# Patient Record
Sex: FEMALE | URBAN - METROPOLITAN AREA
[De-identification: names, ages, dates, MRNs, and addresses within clinical notes are randomized per-mention and may not be internally consistent; named-entity substitution may affect disease eponyms.]

---

## 2017-07-05 ENCOUNTER — HOSPITAL ENCOUNTER (EMERGENCY)
Facility: CLINIC | Age: 22
Discharge: HOME | End: 2017-07-05
Attending: EMERGENCY MEDICINE | Admitting: EMERGENCY MEDICINE

## 2017-07-05 VITALS — HEART RATE: 87 BPM | TEMPERATURE: 98.6 F | OXYGEN SATURATION: 100 % | RESPIRATION RATE: 16 BRPM

## 2017-07-05 DIAGNOSIS — L20.82 FLEXURAL ECZEMA: ICD-10-CM

## 2017-07-05 PROCEDURE — 99282 EMERGENCY DEPT VISIT SF MDM: CPT

## 2017-07-05 RX ORDER — TRIAMCINOLONE ACETONIDE 1 MG/G
CREAM TOPICAL
Qty: 80 G | Refills: 1 | Status: SHIPPED | OUTPATIENT
Start: 2017-07-05 | End: 2017-07-19

## 2017-07-05 RX ORDER — PREDNISONE 50 MG/1
50 TABLET ORAL DAILY
Qty: 5 TABLET | Refills: 0 | Status: SHIPPED | OUTPATIENT
Start: 2017-07-05 | End: 2017-07-10

## 2017-07-05 NOTE — ED AVS SNAPSHOT
[facility]    [address]    [facility] [address]    Phone:  [phone]    Fax:  [phone]                                       Veronica Cody   MRN: [id number]    Department:  [facility]   Date of Visit:  7/5/2017           Patient Information     Date Of Birth          1995        Your diagnoses for this visit were:     Flexural eczema        You were seen by Evelina Mccurdy MD.      Follow-up Information     Follow up with [facility].    Specialty:  EMERGENCY MEDICINE    Why:  As needed, If symptoms worsen    Contact information:    [address]  [facility] [id number] [phone]      Discharge References/Attachments     ATOPIC DERMATITIS (ECZEMA) (ENGLISH)      24 Hour Appointment Hotline       To make an appointment at any [facility], call [phone] ([phone]). If you don't have a family doctor or clinic, we will help you find one. [facility] clinics are conveniently located to serve the needs of you and your family.             Review of your medicines      START taking        Dose / Directions Last dose taken    predniSONE 50 MG tablet   Commonly known as:  DELTASONE   Dose:  50 mg   Quantity:  5 tablet        Take 1 tablet (50 mg) by mouth daily for 5 days   Refills:  0        triamcinolone 0.1 % cream   Commonly known as:  KENALOG   Quantity:  80 g        Apply to areas with rash.   Refills:  1                Prescriptions were sent or printed at these locations (2 Prescriptions)                   Other Prescriptions                Printed at Department/Unit printer (2 of 2)         predniSONE (DELTASONE) 50 MG tablet               triamcinolone (KENALOG) 0.1 % cream                Orders Needing Specimen Collection     None      Pending Results     No orders found from 7/3/2017 to 7/6/2017.            Pending Culture Results     No  orders found from 7/3/2017 to 7/6/2017.            Pending Results Instructions     If you had any lab results that were not finalized at the time of your Discharge, you can call the ED Lab Result RN at [phone]. You will be contacted by this team for any positive Lab results or changes in treatment. The nurses are available 7 days a week from 10A to 6:30P.  You can leave a message 24 hours per day and they will return your call.        Test Results From Your Hospital Stay               Clinical Quality Measure: Blood Pressure Screening     Your blood pressure was checked while you were in the emergency department today. The last reading we obtained was    . Please read the guidelines below about what these numbers mean and what you should do about them.  If your systolic blood pressure (the top number) is less than 120 and your diastolic blood pressure (the bottom number) is less than 80, then your blood pressure is normal. There is nothing more that you need to do about it.  If your systolic blood pressure (the top number) is 120-139 or your diastolic blood pressure (the bottom number) is 80-89, your blood pressure may be higher than it should be. You should have your blood pressure rechecked within a year by a primary care provider.  If your systolic blood pressure (the top number) is 140 or greater or your diastolic blood pressure (the bottom number) is 90 or greater, you may have high blood pressure. High blood pressure is treatable, but if left untreated over time it can put you at risk for heart attack, stroke, or kidney failure. You should have your blood pressure rechecked by a primary care provider within the next 4 weeks.  If your provider in the emergency department today gave you specific instructions to follow-up with your doctor or provider even sooner than that, you should follow that instruction and not wait for up to 4 weeks for your follow-up visit.        Thank you for choosing [facility]      "  Thank you for choosing [facility] for your care. Our goal is always to provide you with excellent care. Hearing back from our patients is one way we can continue to improve our services. Please take a few minutes to complete the written survey that you may receive in the mail after you visit with us. Thank you!        [employer] Information     [employer] lets you send messages to your doctor, view your test results, renew your prescriptions, schedule appointments and more. To sign up, go to [email or web address] . Click on \"Log in\" on the left side of the screen, which will take you to the Welcome page. Then click on \"Sign up Now\" on the right side of the page.     You will be asked to enter the access code listed below, as well as some personal information. Please follow the directions to create your username and password.     Your access code is: [email or web address]  Expires: 10/3/2017 12:03 PM     Your access code will  in 90 days. If you need help or a new code, please call your [facility] clinic or [phone].        Care EveryWhere ID     This is your Care EveryWhere ID. This could be used by other organizations to access your [facility] medical records  [id number]        Equal Access to Services     MINERVA ELIZONDO : Steve coreyo Regis, waaxda luramezadaha, qaybta kaalmada adesintiayada, vic finney. So [facility] [phone].    ATENCIÓN: Si habla español, tiene a mcelroy disposición servicios gratuitos de asistencia lingüística. Llame al [phone].    We comply with applicable federal civil rights laws and [place] laws. We do not discriminate on the basis of race, color, national origin, age, disability sex, sexual orientation or gender identity.            After Visit Summary       This is your record. Keep this with you and show to your community pharmacist(s) and doctor(s) at your next visit.                  "

## 2017-07-05 NOTE — ED PROVIDER NOTES
History     Chief Complaint:  Skin Irritation     HPI   Veronica Cody is a 22 year old female who presents to the emergency department today for evaluation of skin irritation. Patient reports red and itchy bumps on the back of her legs and elbows. Patient reports previous problems with eczema a few years back. The patient has no other concerns at this time.      Allergies:  No Known Drug Allergies      Medications:    The patient is currently on no regular medications.     Past Medical History:    No past medical history on file.    Past Surgical History:    No past surgical history on file.    Family History:    No family history on file.    Social History:  Not on file.      Review of Systems   Skin: Positive for rash (diffuse rash to her legs and arms).   All other systems reviewed and are negative.    Physical Exam     Patient Vitals for the past 24 hrs:   Temp Temp src Pulse Resp SpO2   07/05/17 1155 98.6  F (37  C) Oral 87 18 100 %        Physical Exam  General/Appearance: appears stated age, well-groomed, appears comfortable  Eyes: EOMI, no scleral injection, no icterus  ENT: [facility]  Neck: supple, nl ROM, no stiffness  Cardiovascular: RRR, nl S1S2, no m/r/g, 2+ pulses in all 4 extremities, cap refill <2sec  Respiratory: CTAB, good air movement throughout, no wheezes/rhonchi/rales, no increased WOB, no retractions  MSK: FOLEY, good tone, no bony abnormality  Skin: warm and well-perfused, increased skin thickness and darkening in bilateral AC fossa and axilla with some urticaria to bilateral LE, no edema, no ecchymosis, nl turgor  Neuro: GCS 15, alert and oriented, no gross focal neuro deficits  Psych: interacts appropriately  Heme: no petechia, no purpura, no active bleeding      Emergency Department Course     Emergency Department Course:  Nursing notes and vitals reviewed.  I performed an exam of the patient as documented above.   I discussed the treatment plan with the patient. They expressed understanding of  this plan and consented to discharge. They will be discharged home with instructions for care and follow up. In addition, the patient will return to the emergency department if their symptoms persist, worsen, if new symptoms arise or if there is any concern.  All questions were answered.     Impression & Plan      Medical Decision Making:  This patient is a 22 year old female with history of eczema who presents with the same. She has pruritic axanthum with some thickened skin to her flexor regions. She additionally has some scattered urticaria of non-specific etiology. There is no concerning factors such as Nikolsky's sign, petechiae, or purpura, or oral mucosa or other involvement. She will be discharged home with prednisone as well as topical triamcinolone to be used as needed.      Diagnosis:  No diagnosis found.     Disposition:   Discharged to home with the below prescription      Discharge Medications:  New Prescriptions    PREDNISONE (DELTASONE) 50 MG TABLET    Take 1 tablet (50 mg) by mouth daily for 5 days    TRIAMCINOLONE (KENALOG) 0.1 % CREAM    Apply to areas with rash.     Scribe Disclosure:  I, Curt Dela Cruz, am serving as a scribe at 11:55 AM on 7/5/2017 to document services personally performed by Evelina Mccurdy MD, based on my observations and the provider's statements to me.     Curt Dela Cruz  7/5/2017   [facility] EMERGENCY DEPARTMENT       Evelina Mccurdy MD  07/05/17 8723

## 2017-07-05 NOTE — ED AVS SNAPSHOT
[facility]    [address]    [facility] [address]    Phone:  [phone]    Fax:  [phone]                                       Veronica Cody   MRN: [id number]    Department:  [facility]   Date of Visit:  7/5/2017           After Visit Summary Signature Page     I have received my discharge instructions, and my questions have been answered. I have discussed any challenges I see with this plan with the nurse or doctor.    ..........................................................................................................................................  Patient/Patient Representative Signature      ..........................................................................................................................................  Patient Representative Print Name and Relationship to Patient    ..................................................               ................................................  Date                                            Time    ..........................................................................................................................................  Reviewed by Signature/Title    ...................................................              ..............................................  Date                                                            Time

## 2017-12-26 ENCOUNTER — HOSPITAL ENCOUNTER (INPATIENT)
Facility: CLINIC | Age: 22
LOS: 4 days | Discharge: HOME | DRG: 439 | End: 2017-12-30
Attending: EMERGENCY MEDICINE | Admitting: INTERNAL MEDICINE

## 2017-12-26 ENCOUNTER — TRANSFERRED RECORDS (OUTPATIENT)
Dept: HEALTH INFORMATION MANAGEMENT | Facility: CLINIC | Age: 22
End: 2017-12-26

## 2017-12-26 ENCOUNTER — APPOINTMENT (OUTPATIENT)
Dept: ULTRASOUND IMAGING | Facility: CLINIC | Age: 22
DRG: 439 | End: 2017-12-26
Attending: EMERGENCY MEDICINE

## 2017-12-26 DIAGNOSIS — N39.0 ACUTE UTI: ICD-10-CM

## 2017-12-26 DIAGNOSIS — L20.82 FLEXURAL ECZEMA: ICD-10-CM

## 2017-12-26 DIAGNOSIS — L30.8 OTHER ECZEMA: Primary | ICD-10-CM

## 2017-12-26 DIAGNOSIS — K85.90 ACUTE PANCREATITIS, UNSPECIFIED COMPLICATION STATUS, UNSPECIFIED PANCREATITIS TYPE: ICD-10-CM

## 2017-12-26 DIAGNOSIS — R11.2 NON-INTRACTABLE VOMITING WITH NAUSEA, UNSPECIFIED VOMITING TYPE: ICD-10-CM

## 2017-12-26 DIAGNOSIS — K92.0 HEMATEMESIS WITH NAUSEA: ICD-10-CM

## 2017-12-26 LAB
ALBUMIN UR-MCNC: 30 MG/DL
ALT SERPL-CCNC: 114 U/L (ref 0–78)
APPEARANCE UR: ABNORMAL
AST SERPL-CCNC: 250 U/L (ref 0–37)
BACTERIA #/AREA URNS HPF: ABNORMAL /HPF
BILIRUB UR QL STRIP: NEGATIVE
COLOR UR AUTO: ABNORMAL
CREAT SERPL-MCNC: 0.71 MG/DL (ref 0.6–1.3)
GLUCOSE SERPL-MCNC: 117 MG/DL (ref 70–99)
GLUCOSE UR STRIP-MCNC: NEGATIVE MG/DL
HCG UR QL: NEGATIVE
HGB UR QL STRIP: ABNORMAL
KETONES UR STRIP-MCNC: 20 MG/DL
LEUKOCYTE ESTERASE UR QL STRIP: ABNORMAL
LIPASE SERPL-CCNC: 2623 U/L (ref 73–393)
MUCOUS THREADS #/AREA URNS LPF: PRESENT /LPF
NITRATE UR QL: NEGATIVE
PH UR STRIP: 5 PH (ref 5–7)
POTASSIUM SERPL-SCNC: 4.2 MMOL/L (ref 3.5–5.1)
RBC #/AREA URNS AUTO: 4 /HPF (ref 0–2)
SOURCE: ABNORMAL
SP GR UR STRIP: 1.03 (ref 1–1.03)
SQUAMOUS #/AREA URNS AUTO: 7 /HPF (ref 0–1)
UROBILINOGEN UR STRIP-MCNC: 2 MG/DL (ref 0–2)
WBC #/AREA URNS AUTO: 31 /HPF (ref 0–2)

## 2017-12-26 PROCEDURE — 25000128 H RX IP 250 OP 636: Performed by: INTERNAL MEDICINE

## 2017-12-26 PROCEDURE — 25000125 ZZHC RX 250: Performed by: EMERGENCY MEDICINE

## 2017-12-26 PROCEDURE — 76705 ECHO EXAM OF ABDOMEN: CPT

## 2017-12-26 PROCEDURE — 81001 URINALYSIS AUTO W/SCOPE: CPT | Performed by: EMERGENCY MEDICINE

## 2017-12-26 PROCEDURE — 25000128 H RX IP 250 OP 636: Performed by: EMERGENCY MEDICINE

## 2017-12-26 PROCEDURE — 96375 TX/PRO/DX INJ NEW DRUG ADDON: CPT

## 2017-12-26 PROCEDURE — 96365 THER/PROPH/DIAG IV INF INIT: CPT

## 2017-12-26 PROCEDURE — 96361 HYDRATE IV INFUSION ADD-ON: CPT

## 2017-12-26 PROCEDURE — 99285 EMERGENCY DEPT VISIT HI MDM: CPT | Mod: 25

## 2017-12-26 PROCEDURE — 96376 TX/PRO/DX INJ SAME DRUG ADON: CPT

## 2017-12-26 PROCEDURE — 83690 ASSAY OF LIPASE: CPT | Performed by: EMERGENCY MEDICINE

## 2017-12-26 PROCEDURE — 87086 URINE CULTURE/COLONY COUNT: CPT | Performed by: EMERGENCY MEDICINE

## 2017-12-26 PROCEDURE — 99223 1ST HOSP IP/OBS HIGH 75: CPT | Performed by: INTERNAL MEDICINE

## 2017-12-26 PROCEDURE — 25000132 ZZH RX MED GY IP 250 OP 250 PS 637: Performed by: INTERNAL MEDICINE

## 2017-12-26 PROCEDURE — 12000011 ZZH R&B MS OVERFLOW

## 2017-12-26 PROCEDURE — 81025 URINE PREGNANCY TEST: CPT | Performed by: EMERGENCY MEDICINE

## 2017-12-26 RX ORDER — AMOXICILLIN 250 MG
2 CAPSULE ORAL 2 TIMES DAILY PRN
Status: DISCONTINUED | OUTPATIENT
Start: 2017-12-26 | End: 2017-12-30 | Stop reason: HOSPADM

## 2017-12-26 RX ORDER — ONDANSETRON 4 MG/1
4 TABLET, ORALLY DISINTEGRATING ORAL EVERY 6 HOURS PRN
Status: DISCONTINUED | OUTPATIENT
Start: 2017-12-26 | End: 2017-12-30 | Stop reason: HOSPADM

## 2017-12-26 RX ORDER — MORPHINE SULFATE 4 MG/ML
4 INJECTION, SOLUTION INTRAMUSCULAR; INTRAVENOUS
Status: DISCONTINUED | OUTPATIENT
Start: 2017-12-26 | End: 2017-12-26

## 2017-12-26 RX ORDER — POLYETHYLENE GLYCOL 3350 17 G/17G
17 POWDER, FOR SOLUTION ORAL DAILY PRN
Status: DISCONTINUED | OUTPATIENT
Start: 2017-12-26 | End: 2017-12-30 | Stop reason: HOSPADM

## 2017-12-26 RX ORDER — SODIUM CHLORIDE 9 MG/ML
INJECTION, SOLUTION INTRAVENOUS CONTINUOUS
Status: DISCONTINUED | OUTPATIENT
Start: 2017-12-26 | End: 2017-12-30

## 2017-12-26 RX ORDER — ONDANSETRON 2 MG/ML
4 INJECTION INTRAMUSCULAR; INTRAVENOUS EVERY 6 HOURS PRN
Status: DISCONTINUED | OUTPATIENT
Start: 2017-12-26 | End: 2017-12-30 | Stop reason: HOSPADM

## 2017-12-26 RX ORDER — AMOXICILLIN 250 MG
1 CAPSULE ORAL 2 TIMES DAILY PRN
Status: DISCONTINUED | OUTPATIENT
Start: 2017-12-26 | End: 2017-12-30 | Stop reason: HOSPADM

## 2017-12-26 RX ORDER — OXYCODONE HYDROCHLORIDE 5 MG/1
5-10 TABLET ORAL
Status: DISCONTINUED | OUTPATIENT
Start: 2017-12-26 | End: 2017-12-30 | Stop reason: HOSPADM

## 2017-12-26 RX ORDER — CEFTRIAXONE SODIUM 1 G/50ML
1 INJECTION, SOLUTION INTRAVENOUS ONCE
Status: COMPLETED | OUTPATIENT
Start: 2017-12-26 | End: 2017-12-26

## 2017-12-26 RX ORDER — HYDROMORPHONE HYDROCHLORIDE 1 MG/ML
.3-.5 INJECTION, SOLUTION INTRAMUSCULAR; INTRAVENOUS; SUBCUTANEOUS
Status: DISCONTINUED | OUTPATIENT
Start: 2017-12-26 | End: 2017-12-30 | Stop reason: HOSPADM

## 2017-12-26 RX ORDER — NALOXONE HYDROCHLORIDE 0.4 MG/ML
.1-.4 INJECTION, SOLUTION INTRAMUSCULAR; INTRAVENOUS; SUBCUTANEOUS
Status: DISCONTINUED | OUTPATIENT
Start: 2017-12-26 | End: 2017-12-30 | Stop reason: HOSPADM

## 2017-12-26 RX ORDER — SODIUM CHLORIDE 9 MG/ML
1000 INJECTION, SOLUTION INTRAVENOUS CONTINUOUS
Status: DISCONTINUED | OUTPATIENT
Start: 2017-12-26 | End: 2017-12-26

## 2017-12-26 RX ADMIN — Medication 2 LOZENGE: at 18:55

## 2017-12-26 RX ADMIN — Medication 0.5 MG: at 15:03

## 2017-12-26 RX ADMIN — MORPHINE SULFATE 4 MG: 4 INJECTION INTRAVENOUS at 10:30

## 2017-12-26 RX ADMIN — CEFTRIAXONE SODIUM 1 G: 1 INJECTION, SOLUTION INTRAVENOUS at 12:05

## 2017-12-26 RX ADMIN — SODIUM CHLORIDE: 9 INJECTION, SOLUTION INTRAVENOUS at 23:36

## 2017-12-26 RX ADMIN — MORPHINE SULFATE 4 MG: 4 INJECTION INTRAVENOUS at 12:46

## 2017-12-26 RX ADMIN — SODIUM CHLORIDE 1000 ML: 9 INJECTION, SOLUTION INTRAVENOUS at 10:29

## 2017-12-26 RX ADMIN — SODIUM CHLORIDE: 9 INJECTION, SOLUTION INTRAVENOUS at 14:56

## 2017-12-26 RX ADMIN — PANTOPRAZOLE SODIUM 40 MG: 40 INJECTION, POWDER, FOR SOLUTION INTRAVENOUS at 10:30

## 2017-12-26 RX ADMIN — Medication 0.5 MG: at 18:57

## 2017-12-26 ASSESSMENT — ENCOUNTER SYMPTOMS
BACK PAIN: 1
DIARRHEA: 0
VOMITING: 1
ABDOMINAL PAIN: 1
NAUSEA: 1
BLOOD IN STOOL: 0

## 2017-12-26 NOTE — IP AVS SNAPSHOT
[facility]    [address]    [facility] [address]    Phone:  [phone]    Fax:  [phone]                                       After Visit Summary   12/26/2017    Veronica Cody    MRN: [id number]           After Visit Summary Signature Page     I have received my discharge instructions, and my questions have been answered. I have discussed any challenges I see with this plan with the nurse or doctor.    ..........................................................................................................................................  Patient/Patient Representative Signature      ..........................................................................................................................................  Patient Representative Print Name and Relationship to Patient    ..................................................               ................................................  Date                                            Time    ..........................................................................................................................................  Reviewed by Signature/Title    ...................................................              ..............................................  Date                                                            Time

## 2017-12-26 NOTE — IP AVS SNAPSHOT
MRN:[id number]                      After Visit Summary   12/26/2017    Veronica Cody    MRN: [id number]           Thank you!     Thank you for choosing [facility] for your care. Our goal is always to provide you with excellent care. Hearing back from our patients is one way we can continue to improve our services. Please take a few minutes to complete the written survey that you may receive in the mail after you visit. If you would like to speak to someone directly about your visit please contact Patient Relations at [phone]. Thank you!          Patient Information     Date Of Birth          1995        Designated Caregiver       Most Recent Value    Caregiver    Will someone help with your care after discharge? yes    Name of designated caregiver Tika Tarango    Phone number of caregiver see facesheet    Caregiver address see facesheet      About your hospital stay     You were admitted on:  December 26, 2017 You last received care in the:  [facility]    You were discharged on:  December 30, 2017       Who to Call     For medical emergencies, please call 911.  For non-urgent questions about your medical care, please call your primary care provider or clinic, None          Attending Provider     Provider Specialty    Liseth Basilio MD Emergency Medicine    [facility]       Primary Care Provider Fax #    Physician No Ref-Primary [phone]      After Care Instructions     Activity       Your activity upon discharge: activity as tolerated            Diet       Follow this diet upon discharge: Orders Placed This Encounter      Full Liquid Diet and slowly advance to a low fat diet for 2 weeks, then advance as tolerated to regular diet.                  Follow-up Appointments     Follow-up and recommended labs and tests        Follow up with primary care provider, Physician No Ref-Primary, within 7 days for  "hospital follow- up.  The following labs/tests are recommended: lipase.                  Further instructions from your care team       [place] [phone]  Endoscopic ultrasound 6-8 weeks/hepatology clinic      Pending Results     No orders found from 2017 to 2017.            Statement of Approval     Ordered          17 1318  I have reviewed and agree with all the recommendations and orders detailed in this document.  EFFECTIVE NOW     Approved and electronically signed by:  Joel Swain MD             Admission Information     Date & Time Provider Department Dept. Phone    2017 [place] MD [facility] [phone]      Your Vitals Were     Blood Pressure Pulse Temperature Respirations Height Weight    122/78 66 98.5  F (36.9  C) (Oral) 16 1.63 m (5' 4.17\") 56.4 kg (124 lb 5.4 oz)    Pulse Oximetry BMI (Body Mass Index)                100% 21.23 kg/m2          MyChart Information     [employer] lets you send messages to your doctor, view your test results, renew your prescriptions, schedule appointments and more. To sign up, go to [email or web address] . Click on \"Log in\" on the left side of the screen, which will take you to the Welcome page. Then click on \"Sign up Now\" on the right side of the page.     You will be asked to enter the access code listed below, as well as some personal information. Please follow the directions to create your username and password.     Your access code is: [email or web address]  Expires: 3/30/2018  1:33 PM     Your access code will  in 90 days. If you need help or a new code, please call your [place] clinic or [phone].        Care EveryWhere ID     This is your Care EveryWhere ID. This could be used by other organizations to access your [place] medical records  [id number]        Equal Access to Services     MINERVA ELIZONDO AH: Steve Stacy, denita campbell, rosalinda gabriel, vic hernandez " mariannadorissoraya doll'aan ah. So [facility] [phone].    ATENCIÓN: Si berenicela princess, tiene a mcelroy disposición servicios gratuitos de asistencia lingüística. Rasta al [phone].    We comply with applicable federal civil rights laws and [place] laws. We do not discriminate on the basis of race, color, national origin, age, disability, sex, sexual orientation, or gender identity.               Review of your medicines      START taking        Dose / Directions    hydrocortisone 1 % cream   Commonly known as:  CORTAID   Used for:  Flexural eczema        Apply topically 2 times daily as needed for rash (eczema)   Quantity:  30 g   Refills:  1       ondansetron 4 MG ODT tab   Commonly known as:  ZOFRAN-ODT   Used for:  Acute pancreatitis, unspecified complication status, unspecified pancreatitis type        Dose:  4 mg   Take 1 tablet (4 mg) by mouth every 6 hours as needed for nausea or vomiting   Quantity:  10 tablet   Refills:  0       oxyCODONE IR 5 MG tablet   Commonly known as:  ROXICODONE   Used for:  Acute pancreatitis, unspecified complication status, unspecified pancreatitis type        Dose:  5-10 mg   Take 1-2 tablets (5-10 mg) by mouth every 3 hours as needed for moderate to severe pain   Quantity:  40 tablet   Refills:  0            Where to get your medicines      These medications were sent to [facility] [place] [address]     Phone:  [phone]     hydrocortisone 1 % cream    ondansetron 4 MG ODT tab         Some of these will need a paper prescription and others can be bought over the counter. Ask your nurse if you have questions.     Bring a paper prescription for each of these medications     oxyCODONE IR 5 MG tablet                Protect others around you: Learn how to safely use, store and throw away your medicines at www.disposemymeds.org.             Medication List: This is a list of all your medications and when to  take them. Check marks below indicate your daily home schedule. Keep this list as a reference.      Medications           Morning Afternoon Evening Bedtime As Needed    hydrocortisone 1 % cream   Commonly known as:  CORTAID   Apply topically 2 times daily as needed for rash (eczema)   Last time this was given:  12/30/2017  9:11 AM                                ondansetron 4 MG ODT tab   Commonly known as:  ZOFRAN-ODT   Take 1 tablet (4 mg) by mouth every 6 hours as needed for nausea or vomiting                                oxyCODONE IR 5 MG tablet   Commonly known as:  ROXICODONE   Take 1-2 tablets (5-10 mg) by mouth every 3 hours as needed for moderate to severe pain                                          More Information        Discharge Instructions for Acute Pancreatitis  You have been diagnosed with acute pancreatitis. Your pancreas is inflamed or swollen. The pancreas is an organ that makes digestive juices and hormones. Gallstones are a common cause of pancreatitis. These hard stones form in the gallbladder. The gallbladder shares a tube with the pancreas into the small intestine. If gallstones block this tube, fluid can t leave the pancreas. The fluid backs up and causes redness and swelling (inflammation). There are other causes of pancreatitis. Make sure you understand the cause of your pancreatitis. Then you can try to stop it from happening again.  Immediate home care    Find someone to drive you to appointments. Acute pancreatitis is a serious condition, and you should never drive if you are experiencing symptoms.    Stop drinking if your illness was caused by alcohol.    Ask your healthcare provider about alcohol abuse programs and support groups such as Alcoholics Anonymous.    Ask your provider about prescription medicines that can help you stop drinking.    Tell your provider about the alcohol withdrawal symptoms you have when you stop drinking. This is very important. You may need close  medical supervision and special medicines when you stop drinking. This will depend on your alcohol withdrawal history.     Take your medicines exactly as directed. Don t skip doses.    Eat a low-fat diet. Ask your provider for menus and other diet information.    Learn to take your own pulse. Keep a record of your results. Ask your provider which readings mean that you need medical attention.  Ongoing care    Tell your provider about any medicines you are taking. Some medicines can cause this condition.    Before starting any new medicine, ask your provider if it will harm your pancreas. This includes any new over-the-counter medicines, vitamins, or herbal supplements.      Tell your provider if you lose weight without dieting.    Be aware of symptoms that may mean your pancreatitis has come back. These symptoms include belly pain, nausea and vomiting, and fever.    Keep all follow-up appointments with your provider. Problems can often show up later.  Follow-up  Follow up with your healthcare provider, or as advised.  When to call your provider  Call your healthcare provider right away if you have any of the following:    Fever of 100.4 F (38.0 C) or higher, or as advised by your provider    Severe pain from your upper belly to your back    Nausea and vomiting    Feely dizzy or lightheaded    Yellowing of your skin or eyes (jaundice)    Bruises on your belly or back    Belly swelling and tenderness    Rapid pulse    Shallow, fast breathing   Date Last Reviewed: 8/1/2016 2000-2017 [employer]. [address] [place] [id number]. All rights reserved. This information is not intended as a substitute for professional medical care. Always follow your healthcare professional's instructions.                Lipase  Does this test have other names?  Serum lipase  What is this test?  This test measures the amount of lipase in your blood. Lipase is an enzyme that is made by your pancreas. It helps your  body digest fats.  Higher levels of lipase may mean you have a problem with your pancreas. Most often this means acute pancreatitis, or sudden inflammation of the pancreas.  Why do I need this test?  You may need this test if your healthcare provider suspects that you have a pancreatic disorder. Signs and symptoms of acute pancreatitis include:    Nausea    Vomiting'    Rapid pulse    Fever    Abdominal (belly) or back pain  You also may also have this test if you already have pancreatitis and are being treated. Your provider can use this test to see how well your treatment is working.  What other tests might I have along with this test?  Your healthcare provider may also order other blood tests. These include one to check your levels of amylase, another digestive enzyme that rises if you have pancreatitis.  You may also have an ultrasound, CT scan, MRI, a special MRI called a magnetic resonance cholangiopancreatography of your pancreas. These scans look for gallstones or other abnormalities that sometimes occur with acute pancreatitis.  What do my test results mean?  Many things may affect your lab test results. These include the method each lab uses to do the test. Even if your test results are different from the normal value, you may not have a problem. To learn what the results mean for you, talk with your healthcare provider.  Results are given in units per liter (U/L). The normal range for adults younger than 60 is 0 to 160 U/L.   Higher than normal levels of lipase mean that you have a problem with your pancreas. If your blood has 3 to 10 times the normal level of lipase, then it's likely that you have acute pancreatitis.  High lipase levels also mean you may have kidney failure, cirrhosis, or a bowel problem.  How is this test done?  The test requires a blood sample, which is drawn through a needle from a vein in your arm.  Does this test pose any risks?  Taking a blood sample with a needle carries risks  that include bleeding, infection, bruising, or feeling dizzy. When the needle pricks your arm, you may feel a slight stinging sensation or pain. Afterward, the site may be slightly sore.  What might affect my test results?  Dialysis and a number of medications can affect your test results.  How do I get ready for this test?  You may need to stop eating or drinking anything except water for 8 to 12 hours before this test. Be sure your healthcare provider knows about all medicines, herbs, vitamins, and supplements you are taking. This includes medicines that don't need a prescription and any illicit drugs you may use.     3786-9405 [employer]. [address] [place] [address], [place] [address]. All rights reserved. This information is not intended as a substitute for professional medical care. Always follow your healthcare professional's instructions.

## 2017-12-26 NOTE — H&P
CHIEF COMPLAINT:  Abdominal pain.      HISTORY OF PRESENT ILLNESS:  Ms. Veronica Simons is a very pleasant 22-year-old female.  She has a history of eczema/psoriasis for which she uses topical steroids.  She also has a history of asthma for which she needs no medications in the recent past.  She presents to the hospital today for concerns about nausea, vomiting, abdominal pain for the past 3 days.  She had acute onset of symptoms 3 days ago.  No clear cause. No injuries or trauma.  She had multiple episodes of emesis.  Denies any alcohol use.  The patient went to an outside urgent care for evaluation.  At urgent care, she was found to have elevated amylase consistent with pancreatitis.  Workup also included elevated liver function tests with an AST of 215, ALT of 114.  Total bilirubin was 1.2 and alkaline phosphatase was 152.  She was instructed to come to the ER for evaluation.      On arrival in the ER, vital signs included a blood pressure 120/69 with heart rate of 70.  Temperature 99 degrees, saturation 99% on room air.  Workup in the ER included basic labs and imaging studies.  Lipase was 2600.  Pregnancy test is negative.  Urinalysis shows 31 white blood cells and 4 red blood cells.  Urine culture is pending.  Abdominal ultrasound showed hepatomegaly and diffuse fatty infiltration of liver with coarsened hepatic echotexture.  Common bile duct appeared normal, with no biliary ductal dilatation.  I spoke with Dr. Basilio in the ER, who is requesting admission of the patient to the hospital, with a diagnosis of pancreatitis.      PAST MEDICAL HISTORY:   1.  Asthma, more predominant in childhood.  Over the past few years she has not required any medications to treat this.   2.  Eczema/psoriasis.   She is currently on a steroid cream for treatment.  She had been taking prednisone in the past intermittently for this.  Last dose of prednisone was in November.  No recent prednisone use.   3.  History of bronchitis.       CURRENT MEDICATIONS:  Await pharmacy reconciliation of medication list.      CURRENT MEDICATIONS INCLUDE:  A steroid cream (of which the name is not yet known).      DRUG ALLERGIES:  None.      FAMILY HISTORY:  No pancreatitis in the family.      SOCIAL HISTORY:  Denies smoking.  Minimal alcohol use:  perhaps an alcoholic beverage once every several weeks.  No drug use.      REVIEW OF SYSTEMS:  Please see HPI for details.  Comprehensive greater than 10-point review of systems otherwise negative besides that detailed in History of Present Illness.      PHYSICAL EXAMINATION:   VITAL SIGNS:  Blood pressure is currently 135/90 with heart rate of 63.  Temperature 99 degrees, saturation 99% on room air.   GENERAL:  The patient appears nontoxic and in no acute distress.  She appears awake, alert and oriented x3.  She is very pleasant person.  Does not appear to be in any acute distress.   HEENT:  Head is atraumatic.  Sclerae are white.  Eyelids are normal.  Conjunctivae are normal.  Extraocular movements are intact.   NECK:  Supple.  No cervical or supraclavicular lymphadenopathy.   HEART:  Regular rate and rhythm.  No significant murmurs.  No lower extremity edema.   LUNGS:  Clear to auscultation bilaterally.  No intercostal retractions.  No conversational dyspnea.   ABDOMEN:  Notable for tenderness to palpation in the epigastric region.  Hypoactive bowel sounds present.  No rebound.  No guarding.  No organomegaly.   EXTREMITIES:  No edema.   SKIN:  Reveals no rashes.  No jaundice.  Skin is dry to touch.   NEUROLOGIC:  Cranial nerves II-XII are intact.  Moves all extremities appropriately.  Sensation intact to light touch in upper and lower extremities bilaterally.   PSYCHIATRIC:  The patient awake, alert and oriented x3.  Mood and affect are appropriate.      LABORATORY AND IMAGING DATA:  Reviewed.      IMPRESSION AND PLAN:  Ms. Simons is a 22-year-old female who presents to the hospital with nausea, vomiting,  abdominal pain for the past 3 days.  She was seen at Urgent Care and found to have an amylase and mildly elevated liver function tests as detailed above.  On arrival here to the ER, further workup included a lipase noted to be elevated at 2600 and an abdominal ultrasound was performed showing mild hepatomegaly and fatty infiltration of the liver.  No ulcer, no gallstones were found.  She is being admitted with a diagnosis of pancreatitis.   Acute pancreatitis:  Etiology unclear.  No significant alcohol use.  No gallstones on imaging studies, but given her right upper quadrant pain and elevated liver function tests we need to consider either a small stone or a rolled stone.  Will check triglyceride levels as well.  She is on no oral medications, so doubt medication-induced pancreatitis.      PLAN:   1.  Admit to inpatient service.  Anticipate greater than 2 midnights in the hospital in the setting of pancreatitis.   2.  Intravenous fluid hydration.   3.  Pain control as needed.   4.  Gastrointestinal consultation to assist with the etiology of pancreatitis.  If gallstone pancreatitis is suspected, would obtain General Surgery consultation.  But, we will hold off for now and await Gastroenterology's input.   5.  We will check hepatitis B and C serologies in the setting of her elevated liver function tests and fatty infiltration of liver on ultrasound.   6.  Check triglyceride level in the setting of pancreatitis of unclear etiology.         HILLARY DA SILVA MD             D: 2017 14:22   T: 2017 14:57   MT: PHAM#[id number]      Name:     JOSÉ CALVILLO   MRN:      [id number]        Account:      [id number]   :      1995           Admitted:     [id number]      Document: [id number]

## 2017-12-26 NOTE — PHARMACY-ADMISSION MEDICATION HISTORY
Admission medication history interview status for this patient is complete. See [facility] admission navigator for allergy information, prior to admission medications and immunization status.     Medication history interview source(s):Patient  Medication history resources (including written lists, pill bottles, clinic record):None  Primary pharmacy[facility]    Changes made to PTA medication list:  Pt denies all rx and otc medication use.     Actions taken by pharmacist (provider contacted, etc):None     Additional medication history information:None    Medication reconciliation/reorder completed by provider prior to medication history? No    For patients on insulin therapy: No (Yes/No)     Prior to Admission medications    No OTC or Rx medications

## 2017-12-26 NOTE — ED PROVIDER NOTES
History     Chief Complaint:  Abdominal Pain    HPI   Veronica Cody is an otherwise healthy 22 year old female who presents with abdominal pain. Patient reports she has been experiencing mid upper abdominal pain radiating to her back bilaterally for the last 3 days. Patient was at [facility] this morning, where basic blood work and urinalysis was obtained. She was also given Toradol which she reports helped. Patient has associated nausea, vomiting with minor streaks of blood later in vomiting episodes. She also notes spotting of blood which she attributes to her urine.. Patient denies black or bloody stools, diarrhea. Last menstrual period was 2 weeks ago and normal for her. No previous medical history of problems with pancreas or gallbladder.     [facility] basic laboratory and urinalysis  CMP: Glucose 117.0, Calcium 10.3, Alkaline Phosphatase 152.0, , .0, Total bilirubin 1.17, total protein 8.3, Globulin 4.1 WNL (Creatinine 0.71)   CBC: WNL (WBC 4.8, HGB 12.8, )    UA: Glucose Trace, Leukocytes Trace, Nitrite Positive, Blood 3+, Bilirubin 2+, Ketones 2+    Allergies:  No known drug allergies     Medications:    Citalopram     Past Medical History:    The patient does not have any past pertinent medical history.     Past Surgical History:    History reviewed. No pertinent surgical history.     Family History:    History reviewed. No pertinent family history.      Social History:  Marital Status:  Single [1]     Review of Systems   Gastrointestinal: Positive for abdominal pain, nausea and vomiting. Negative for blood in stool and diarrhea.   Musculoskeletal: Positive for back pain.   All other systems reviewed and are negative.    Physical Exam   Patient Vitals for the past 24 hrs:   BP Temp Temp src Pulse Resp SpO2   12/26/17 1420 (!) 136/91 98.3  F (36.8  C) Oral 52 20 100 %   12/26/17 1215 - - - - - 98 %   12/26/17 1100 - - - - - 99 %   12/26/17 1058 - - -  - - 98 %   12/26/17 1056 (!) 127/92 - - - - -   12/26/17 1035 - - - - - 100 %   12/26/17 1020 124/79 - - - - -   12/26/17 0911 123/69 99  F (37.2  C) Temporal 68 20 99 %      Physical Exam  General: Adult female sitting upright.  Eyes: PERRL, Conjunctive within normal limits.No scleral icterus.   ENT: Moist mucous membranes, oropharynx clear.   CV: Normal S1S2, no murmur, rub or gallop. Regular rate and rhythm  Resp: Clear to auscultation bilaterally, no wheezes, rales or rhonchi. Normal respiratory effort.  GI: Abdomen is soft nondistended. No palpable masses. No rebound or guarding. Diffuse upper abdominal pain, prominent in the epigastrium and right upper quadrant.  MSK: No edema. Nontender. Normal active range of motion.  Skin: Warm and dry. No rashes or lesions or ecchymoses on visible skin.  Neuro: Alert and oriented. Responds appropriately to all questions and commands. No focal findings appreciated. Normal muscle tone.  Psych: Normal mood and affect. Pleasant.     Emergency Department Course   Imaging:  Radiographic findings were communicated with the patient who voiced understanding of the findings.  US Abdomen Limited  1. Hepatomegaly and diffuse fatty infiltration of the liver with  coarsened hepatic echotexture.  2. Pancreas partially obscured by gas.  3. No specific etiology of abdominal pain is seen.  As read by Radiology.     Laboratory:  UA: Urine bloo Large, Protein albumin 30, Urineketon 20, Leukocyte esterase Moderate, WBC/HPF   Lipase: 2623 (H)  HCG: Negative.  Urine culture: Pending.    Interventions:  1029: NS 1L IV Bolus   1030: Morphine 4 mg IV  1030: Protonix 40 mg IV    Emergency Department Course:  Past medical records, nursing notes, and vitals reviewed.  0942: I performed an exam of the patient and obtained history, as documented above.   1015: Urine specimen obtained, results above.   1030: Patient was treated with the above interventions.    1107: The patient was sent for a US abdomen  limited while in the emergency department, findings above.   Patient reassessed. Feels less pain after medication. Repeat abdominal examination consistent with ongoing upper abdominal pain, most prominent epigastric area.  Findings and plan explained to the Patient who consents to admission.     1257: Discussed the patient with Dr. Brumfield, who will admit the patient to a med/surg bed for further monitoring, evaluation, and treatment.      Impression & Plan    Medical Decision Making:  Veronica Cody is a 22 year old female who presents to the emergency department with concerns for upper abdominal pain. Pain is predominantly in the epigastrium in the right upper quadrant. She has evidence of pancreatitis by laboratory enzymes with elevated LFTs. I was concerned about possible obstructing stone however there are no stones evident, the gallbladder with no evidence of gallbladder process. Etiology of the pancreatitis is not entirely clear. The patient does not use alcohol on a regular basis and does not have binge drinking episode recently. She denies any new medications aside from her daily depression medication as well as prednisone which was a short course. She did report mild slight streaky blood in her vomit, she was given Protonix but seems unlikely to represent Boerhaave Syndrome or gastric ulcers/major ulcer bleeding. She does not have a fever or any significant infectious symptoms. She has ongoing pain and has evidence of probable UTI with reports of small amounts of blood when she wipes from the urethral region and will be treated appropriately for this. She will be admitted given her ongoing pain and for further care and assessment of acute pancreatitis. I discussed the plan with the patient and she verbalizes understanding and agreement. All questions answered prior to admission.     Diagnosis:    ICD-10-CM    1. Acute pancreatitis, unspecified complication status, unspecified pancreatitis type K85.90    2.  Non-intractable vomiting with nausea, unspecified vomiting type R11.2    3. Hematemesis with nausea K92.0    4. Acute UTI N39.0      Disposition:  Admitted to the [facility]  12/26/2017   [facility] EMERGENCY DEPARTMENT  I, Coleen Souza, am serving as a scribe at 9:42 AM on 12/26/2017 to document services personally performed by Liseth Basilio MD based on my observations and the provider's statements to me.       Liseth Basilio MD  12/27/17 9683

## 2017-12-26 NOTE — ED NOTES
Patient presents to the ED with abdominal pain x 3 days. States initially began with epigastric pain and vomiting. Vomiting has since resolved and pain is now more generalized. Sent from [facility]. Had blood work done there, results on paper chart.

## 2017-12-27 ENCOUNTER — APPOINTMENT (OUTPATIENT)
Dept: CT IMAGING | Facility: CLINIC | Age: 22
DRG: 439 | End: 2017-12-27
Attending: INTERNAL MEDICINE

## 2017-12-27 LAB
ALBUMIN SERPL-MCNC: 3 G/DL (ref 3.4–5)
ALP SERPL-CCNC: 106 U/L (ref 40–150)
ALT SERPL W P-5'-P-CCNC: 65 U/L (ref 0–50)
ANION GAP SERPL CALCULATED.3IONS-SCNC: 6 MMOL/L (ref 3–14)
AST SERPL W P-5'-P-CCNC: 124 U/L (ref 0–45)
BACTERIA SPEC CULT: NORMAL
BILIRUB SERPL-MCNC: 0.6 MG/DL (ref 0.2–1.3)
BUN SERPL-MCNC: 5 MG/DL (ref 7–30)
CALCIUM SERPL-MCNC: 8.3 MG/DL (ref 8.5–10.1)
CHLORIDE SERPL-SCNC: 107 MMOL/L (ref 94–109)
CO2 SERPL-SCNC: 26 MMOL/L (ref 20–32)
CREAT SERPL-MCNC: 0.56 MG/DL (ref 0.52–1.04)
ERYTHROCYTE [DISTWIDTH] IN BLOOD BY AUTOMATED COUNT: 16.8 % (ref 10–15)
GFR SERPL CREATININE-BSD FRML MDRD: >90 ML/MIN/1.7M2
GLUCOSE SERPL-MCNC: 76 MG/DL (ref 70–99)
HBV SURFACE AG SERPL QL IA: NONREACTIVE
HCT VFR BLD AUTO: 37 % (ref 35–47)
HCV AB SERPL QL IA: NONREACTIVE
HGB BLD-MCNC: 11.7 G/DL (ref 11.7–15.7)
LIPASE SERPL-CCNC: 2051 U/L (ref 73–393)
LIPASE SERPL-CCNC: 2102 U/L (ref 73–393)
Lab: NORMAL
MCH RBC QN AUTO: 30.2 PG (ref 26.5–33)
MCHC RBC AUTO-ENTMCNC: 31.6 G/DL (ref 31.5–36.5)
MCV RBC AUTO: 95 FL (ref 78–100)
PLATELET # BLD AUTO: 205 10E9/L (ref 150–450)
POTASSIUM SERPL-SCNC: 3.8 MMOL/L (ref 3.4–5.3)
PROT SERPL-MCNC: 6.3 G/DL (ref 6.8–8.8)
RBC # BLD AUTO: 3.88 10E12/L (ref 3.8–5.2)
SODIUM SERPL-SCNC: 139 MMOL/L (ref 133–144)
SPECIMEN SOURCE: NORMAL
TRIGL SERPL-MCNC: 63 MG/DL
WBC # BLD AUTO: 3.7 10E9/L (ref 4–11)

## 2017-12-27 PROCEDURE — 36415 COLL VENOUS BLD VENIPUNCTURE: CPT | Performed by: INTERNAL MEDICINE

## 2017-12-27 PROCEDURE — 85027 COMPLETE CBC AUTOMATED: CPT | Performed by: INTERNAL MEDICINE

## 2017-12-27 PROCEDURE — 25000128 H RX IP 250 OP 636: Performed by: INTERNAL MEDICINE

## 2017-12-27 PROCEDURE — 83690 ASSAY OF LIPASE: CPT | Performed by: INTERNAL MEDICINE

## 2017-12-27 PROCEDURE — 99233 SBSQ HOSP IP/OBS HIGH 50: CPT | Performed by: INTERNAL MEDICINE

## 2017-12-27 PROCEDURE — 80053 COMPREHEN METABOLIC PANEL: CPT | Performed by: INTERNAL MEDICINE

## 2017-12-27 PROCEDURE — 84478 ASSAY OF TRIGLYCERIDES: CPT | Performed by: INTERNAL MEDICINE

## 2017-12-27 PROCEDURE — 74178 CT ABD&PLV WO CNTR FLWD CNTR: CPT

## 2017-12-27 PROCEDURE — 87340 HEPATITIS B SURFACE AG IA: CPT | Performed by: INTERNAL MEDICINE

## 2017-12-27 PROCEDURE — 12000011 ZZH R&B MS OVERFLOW

## 2017-12-27 PROCEDURE — 86803 HEPATITIS C AB TEST: CPT | Performed by: INTERNAL MEDICINE

## 2017-12-27 RX ORDER — IOPAMIDOL 755 MG/ML
500 INJECTION, SOLUTION INTRAVASCULAR ONCE
Status: COMPLETED | OUTPATIENT
Start: 2017-12-27 | End: 2017-12-27

## 2017-12-27 RX ADMIN — Medication 0.5 MG: at 12:06

## 2017-12-27 RX ADMIN — SODIUM CHLORIDE: 9 INJECTION, SOLUTION INTRAVENOUS at 10:28

## 2017-12-27 RX ADMIN — SODIUM CHLORIDE 56 ML: 9 INJECTION, SOLUTION INTRAVENOUS at 17:21

## 2017-12-27 RX ADMIN — IOPAMIDOL 62 ML: 755 INJECTION, SOLUTION INTRAVENOUS at 17:21

## 2017-12-27 RX ADMIN — Medication 0.5 MG: at 15:47

## 2017-12-27 RX ADMIN — ONDANSETRON 4 MG: 2 INJECTION INTRAMUSCULAR; INTRAVENOUS at 04:44

## 2017-12-27 RX ADMIN — Medication 0.5 MG: at 08:40

## 2017-12-27 RX ADMIN — Medication 0.5 MG: at 02:50

## 2017-12-27 RX ADMIN — SODIUM CHLORIDE: 9 INJECTION, SOLUTION INTRAVENOUS at 23:04

## 2017-12-27 RX ADMIN — SODIUM CHLORIDE: 9 INJECTION, SOLUTION INTRAVENOUS at 17:01

## 2017-12-27 ASSESSMENT — PAIN DESCRIPTION - DESCRIPTORS: DESCRIPTORS: PRESSURE

## 2017-12-27 NOTE — PLAN OF CARE
Problem: Pancreatitis, Acute/Chronic (Adult)  Goal: Signs and Symptoms of Listed Potential Problems Will be Absent, Minimized or Managed (Pancreatitis, Acute/Chronic)  Signs and symptoms of listed potential problems will be absent, minimized or managed by discharge/transition of care (reference Pancreatitis, Acute/Chronic (Adult) CPG).  Outcome: Improving  Pt VSS. Afebrile. Heartrate low at times, into the 40's. Pain well managed with dilaudid, given x2. Pt NPO except for a few ice chips to wet her mouth. Voiding.

## 2017-12-27 NOTE — CONSULTS
[facility]    Gastroenterology Consultation    Date of Admission:  12/26/2017  Date of Consult (When I saw the patient): 12/27/17    Assessment & Plan   Veronica Cody is a 22 year old female who was admitted on 12/26/2017. I was asked to see the patient for abdominal pain.    Midepigastric abdominal pain  Acute pancreatitis  Elevated transaminases  Hepatomegaly and coarsened liver texture on US  Eczema  Psoriasis    The patient has acute pancreatitis without etiology at this time. Triglyceride level is pending.  The US is negative for gallstones.   She is behind in fluids. The fluids have now been increased.  A CT scan of the abdomen/pelvis will be ordered next.  If no cause is found for the pancreatitis, then an outpatient EUS will be planned after 6-8 weeks after resolution of the pancreatitis.  Follow lipase levels.  Hepatitis serologies will be obtained.      Radha Felix MD  [facility]  Office: [phone]  Cell:  [phone]  Monday through Thursday 8am to 5pm, Friday 8am to 4pm    Reason for Consult   Reason for consult: I was asked by Dr. Main Brumfield to evaluate this patient for abdominal pain.    Primary Care Physician   Physician No Ref-Primary    Chief Complaint   Abdominal pain    History is obtained from the patient and medical records    History of Present Illness   Veronica Cody is a 22 year old female who presents with the acute onset 12/3/17 of midepigastric pain. This became worse over the next few days until she presented here for admission. Today, she is feeling better than when this began. She is receiving pain medication.  Her urine is dark in color. She is feeling thirsty.  She had nausea when her pain began, but has not been having any nausea today.  She has been passing stools and gas.  There is no family history of gallbladder disease nor of pancreatitis. She has an occasional glass of wine, but not very often.   She has no history of elevated  lipids nor of a family history.  She is not taking any oral medication.  Her only medical problems include psoriasis and eczema.      Past Medical History    I have reviewed this patient's medical history and updated it with pertinent information if needed.   No past medical history on file.    Past Surgical History   I have reviewed this patient's surgical history and updated it with pertinent information if needed.  No past surgical history on file.    Prior to Admission Medications   None     Allergies   No Known Allergies    Social History   I have reviewed this patient's social history and updated it with pertinent information if needed. Veronica Cody      Family History   I have reviewed this patient's family history and updated it with pertinent information if needed.   No family history on file.    Review of Systems   The 10 point Review of Systems is negative other than noted in the HPI or here.     Physical Exam   Temp: 97.9  F (36.6  C) Temp src: Oral BP: 133/79 Pulse: 57   Resp: 16 SpO2: 98 % O2 Device: None (Room air)    Vital Signs with Ranges  Temp:  [97.5  F (36.4  C)-98.3  F (36.8  C)] 97.9  F (36.6  C)  Pulse:  [52-82] 57  Resp:  [16-20] 16  BP: (123-138)/(79-96) 133/79  SpO2:  [97 %-100 %] 98 %  0 lbs 0 oz      Constitutional: Awake, alert, cooperative, no apparent distress.  Eyes: Conjunctiva and pupils examined and normal.  HEENT: Moist mucous membranes, normal dentition.  Respiratory: Clear to auscultation bilaterally, no crackles or wheezing.  Cardiovascular: Regular rate and rhythm, normal S1 and S2, and no murmur noted.  GI: Soft, mildly distended and tympanitic in the upper abdomen, tender in the upper abdomen without rebound tenderness nor guarding, normal bowel sounds. No LSKKM.  Lymph/Hematologic: No anterior cervical or supraclavicular adenopathy.  Skin: Erythematous areas on upper chest and neck, no cyanosis, no edema.  Musculoskeletal: No joint swelling, erythema or  tenderness.  Neurologic: Cranial nerves 2-12 intact, normal strength and sensation.  Psychiatric: Alert, oriented to person, place and time, no obvious anxiety or depression.    Data   -Data reviewed today: All pertinent laboratory and imaging results from this encounter were reviewed.    Recent Labs  Lab 12/27/17  0643 12/26/17  1030   WBC 3.7*  --    HGB 11.7  --    MCV 95  --      --      --    POTASSIUM 3.8  --    CHLORIDE 107  --    CO2 26  --    BUN 5*  --    CR 0.56  --    ANIONGAP 6  --    ALPHONSO 8.3*  --    GLC 76  --    ALBUMIN 3.0*  --    PROTTOTAL 6.3*  --    BILITOTAL 0.6  --    ALKPHOS 106  --    ALT 65*  --    *  --    LIPASE 2102* 2623*     No results for input(s): IRON, IRONSAT, RETICABSCT, RETP, FEB, SHERRI, B12, FOLIC, EPOE, MORPH in the last 168 hours.    No results found for this or any previous visit (from the past 24 hour(s)).

## 2017-12-27 NOTE — PLAN OF CARE
Problem: Pancreatitis, Acute/Chronic (Adult)  Goal: Signs and Symptoms of Listed Potential Problems Will be Absent, Minimized or Managed (Pancreatitis, Acute/Chronic)  Signs and symptoms of listed potential problems will be absent, minimized or managed by discharge/transition of care (reference Pancreatitis, Acute/Chronic (Adult) CPG).   Outcome: No Change  Orientation: A&Ox3  Vss afebrile.  99% ra  LS:clear  GI: bs+. C/o pain mid epigastric and back spasms.  : voiding with no problems.  Skin:intact  Activity: up indep in room and to br  Pain: c/o abd pain. Dilaudid 0.5mg ivp x2  Plan: ct abd this afternoon. Pain management. ivf.

## 2017-12-27 NOTE — PROGRESS NOTES
[facility]    Hospitalist Progress Note    Date of Service (when I saw the patient): 12/27/2017    Assessment & Plan   Ms. Simons is a 22-year-old female who presents to the hospital with nausea, vomiting, abdominal pain for the past 3 days. She was seen at Urgent Care and found to have an amylase and mildly elevated liver function tests. On arrival here to the ER, further workup included a lipase noted to be elevated at 2600 and an abdominal ultrasound was performed showing mild hepatomegaly and fatty infiltration of the liver. No ulcer, no gallstones were found. She was admitted with a diagnosis of pancreatitis.     1. Acute pancreatitis:  Etiology unclear.  No significant alcohol use.  No gallstones on imaging studies.   Triglycerides are normal.  She is on no oral medications, so doubt medication-induced pancreatitis.   --Will continue aggressive fluid resuscitation. We will also continue pain control.  Recheck lipase tomorrow.  -- She was seen by gastroenterology today. CT of the abdomen/pelvis was ordered for further evaluation. Will follow result.      2.  Nausea/vomiting secondary to above: Zofran as needed.    3.  Abnormal urinalysis: Urine culture showed only urogenital michelle. Patient denies any urinary symptoms. No antibiotics at this moment unless patient develops symptoms.    DVT Prophylaxis: Pneumatic Compression Devices    Code Status: Full Code    Disposition: Expected discharge: Anticipate at least 2 nights of hospital course until her pancreatitis improves.    Leodan Valentino MD    Interval History   Patient seen and examined.  She stated his abdominal pain is improving. She denies nausea or vomiting at this moment. She also denies dysuria,  urgency, frequency. She has no fever.    -Data reviewed today: I reviewed all new labs and imaging results over the last 24 hours.  Physical Exam   Temp: 97.9  F (36.6  C) Temp src: Oral BP: 133/79 Pulse: 57   Resp: 16 SpO2: 98 % O2 Device: None  (Room air)    There were no vitals filed for this visit.  Vital Signs with Ranges  Temp:  [97.5  F (36.4  C)-98.1  F (36.7  C)] 97.9  F (36.6  C)  Pulse:  [52-82] 57  Resp:  [16-18] 16  BP: (123-138)/(79-96) 133/79  SpO2:  [97 %-100 %] 98 %  I/O last 3 completed shifts:  In: 2461.67 [I.V.:2461.67]  Out: 750 [Urine:750]    GEN:  Alert, oriented x 3, appears comfortable, NAD.  HEENT:  Normocephalic/atraumatic, no scleral icterus, no nasal discharge, mouth moist.  CV:  Regular rate and rhythm, no murmur or JVD.  S1 + S2 noted, no S3 or S4.  LUNGS:  Clear to auscultation bilaterally without rales/rhonchi/wheezing/retractions.  Symmetric chest rise on inhalation noted.  ABD: Active bowel sounds. Tenderness in upper abdomen.  No rebound/guarding/rigidity.  EXT:  No edema or cyanosis.  Hands/feet warm to touch with good signs of peripheral perfusion.  No joint synovitis noted.  SKIN:  Dry to touch, no exanthems noted in the visualized areas.  NEURO:  Symmetric muscle strength, sensation to touch grossly intact.  No new focal deficits appreciated.    Medications     NaCl 200 mL/hr at 12/27/17 1335       iohexol  50 mL Oral Once     influenza quadrivalent (PF) vacc age 3 yrs and older  0.5 mL Intramuscular Prior to discharge       Data     Recent Labs  Lab 12/27/17  0643 12/26/17  1030   WBC 3.7*  --    HGB 11.7  --    MCV 95  --      --      --    POTASSIUM 3.8  --    CHLORIDE 107  --    CO2 26  --    BUN 5*  --    CR 0.56  --    ANIONGAP 6  --    ALPHONSO 8.3*  --    GLC 76  --    ALBUMIN 3.0*  --    PROTTOTAL 6.3*  --    BILITOTAL 0.6  --    ALKPHOS 106  --    ALT 65*  --    *  --    LIPASE 2102* 2623*       No results found for this or any previous visit (from the past 24 hour(s)).

## 2017-12-27 NOTE — PLAN OF CARE
Problem: Patient Care Overview  Goal: Plan of Care/Patient Progress Review  Has complained of mid epigastric pain rated 5 or 6 with relief after Dilaudid. Medicated with Zofran for nausea with relief. Urine is Dark tata. Afebrile. IV infusing at 100 cc's/hr. NPO except for ice chips. Afebrile.  Plan for AM labs.

## 2017-12-28 LAB
ALBUMIN SERPL-MCNC: 2.9 G/DL (ref 3.4–5)
ALP SERPL-CCNC: 90 U/L (ref 40–150)
ALT SERPL W P-5'-P-CCNC: 56 U/L (ref 0–50)
ANION GAP SERPL CALCULATED.3IONS-SCNC: 9 MMOL/L (ref 3–14)
AST SERPL W P-5'-P-CCNC: 94 U/L (ref 0–45)
BASOPHILS # BLD AUTO: 0 10E9/L (ref 0–0.2)
BASOPHILS NFR BLD AUTO: 0.9 %
BILIRUB DIRECT SERPL-MCNC: 0.3 MG/DL (ref 0–0.2)
BILIRUB SERPL-MCNC: 1 MG/DL (ref 0.2–1.3)
BUN SERPL-MCNC: 2 MG/DL (ref 7–30)
CALCIUM SERPL-MCNC: 7.7 MG/DL (ref 8.5–10.1)
CHLORIDE SERPL-SCNC: 106 MMOL/L (ref 94–109)
CO2 SERPL-SCNC: 24 MMOL/L (ref 20–32)
CREAT SERPL-MCNC: 0.45 MG/DL (ref 0.52–1.04)
DIFFERENTIAL METHOD BLD: ABNORMAL
EOSINOPHIL # BLD AUTO: 0.1 10E9/L (ref 0–0.7)
EOSINOPHIL NFR BLD AUTO: 2.8 %
ERYTHROCYTE [DISTWIDTH] IN BLOOD BY AUTOMATED COUNT: 16 % (ref 10–15)
GFR SERPL CREATININE-BSD FRML MDRD: >90 ML/MIN/1.7M2
GLUCOSE SERPL-MCNC: 76 MG/DL (ref 70–99)
HAV IGG SER QL IA: NONREACTIVE
HCT VFR BLD AUTO: 34.9 % (ref 35–47)
HGB BLD-MCNC: 11.2 G/DL (ref 11.7–15.7)
IMM GRANULOCYTES # BLD: 0 10E9/L (ref 0–0.4)
IMM GRANULOCYTES NFR BLD: 0.3 %
LIPASE SERPL-CCNC: 1256 U/L (ref 73–393)
LYMPHOCYTES # BLD AUTO: 1 10E9/L (ref 0.8–5.3)
LYMPHOCYTES NFR BLD AUTO: 32.9 %
MCH RBC QN AUTO: 30.1 PG (ref 26.5–33)
MCHC RBC AUTO-ENTMCNC: 32.1 G/DL (ref 31.5–36.5)
MCV RBC AUTO: 94 FL (ref 78–100)
MONOCYTES # BLD AUTO: 0.3 10E9/L (ref 0–1.3)
MONOCYTES NFR BLD AUTO: 10.1 %
NEUTROPHILS # BLD AUTO: 1.7 10E9/L (ref 1.6–8.3)
NEUTROPHILS NFR BLD AUTO: 53 %
NRBC # BLD AUTO: 0 10*3/UL
NRBC BLD AUTO-RTO: 0 /100
PLATELET # BLD AUTO: 213 10E9/L (ref 150–450)
POTASSIUM SERPL-SCNC: 3.1 MMOL/L (ref 3.4–5.3)
POTASSIUM SERPL-SCNC: 3.4 MMOL/L (ref 3.4–5.3)
PROT SERPL-MCNC: 6.1 G/DL (ref 6.8–8.8)
RBC # BLD AUTO: 3.72 10E12/L (ref 3.8–5.2)
SODIUM SERPL-SCNC: 139 MMOL/L (ref 133–144)
WBC # BLD AUTO: 3.2 10E9/L (ref 4–11)

## 2017-12-28 PROCEDURE — 84132 ASSAY OF SERUM POTASSIUM: CPT | Performed by: INTERNAL MEDICINE

## 2017-12-28 PROCEDURE — 25000132 ZZH RX MED GY IP 250 OP 250 PS 637: Performed by: INTERNAL MEDICINE

## 2017-12-28 PROCEDURE — 82787 IGG 1 2 3 OR 4 EACH: CPT | Performed by: PHYSICIAN ASSISTANT

## 2017-12-28 PROCEDURE — 36415 COLL VENOUS BLD VENIPUNCTURE: CPT | Performed by: INTERNAL MEDICINE

## 2017-12-28 PROCEDURE — 12000011 ZZH R&B MS OVERFLOW

## 2017-12-28 PROCEDURE — 25000128 H RX IP 250 OP 636: Performed by: INTERNAL MEDICINE

## 2017-12-28 PROCEDURE — 80048 BASIC METABOLIC PNL TOTAL CA: CPT | Performed by: INTERNAL MEDICINE

## 2017-12-28 PROCEDURE — 85025 COMPLETE CBC W/AUTO DIFF WBC: CPT | Performed by: INTERNAL MEDICINE

## 2017-12-28 PROCEDURE — 99232 SBSQ HOSP IP/OBS MODERATE 35: CPT | Performed by: INTERNAL MEDICINE

## 2017-12-28 PROCEDURE — 82787 IGG 1 2 3 OR 4 EACH: CPT | Performed by: INTERNAL MEDICINE

## 2017-12-28 PROCEDURE — 82784 ASSAY IGA/IGD/IGG/IGM EACH: CPT | Performed by: INTERNAL MEDICINE

## 2017-12-28 PROCEDURE — 83690 ASSAY OF LIPASE: CPT | Performed by: INTERNAL MEDICINE

## 2017-12-28 PROCEDURE — 86708 HEPATITIS A ANTIBODY: CPT | Performed by: INTERNAL MEDICINE

## 2017-12-28 PROCEDURE — 80076 HEPATIC FUNCTION PANEL: CPT | Performed by: INTERNAL MEDICINE

## 2017-12-28 RX ORDER — ACETAMINOPHEN 325 MG/1
650 TABLET ORAL EVERY 4 HOURS PRN
Status: DISCONTINUED | OUTPATIENT
Start: 2017-12-28 | End: 2017-12-30 | Stop reason: HOSPADM

## 2017-12-28 RX ORDER — POTASSIUM CHLORIDE 29.8 MG/ML
20 INJECTION INTRAVENOUS
Status: DISCONTINUED | OUTPATIENT
Start: 2017-12-28 | End: 2017-12-28

## 2017-12-28 RX ORDER — POTASSIUM CHLORIDE 1500 MG/1
20-40 TABLET, EXTENDED RELEASE ORAL
Status: DISCONTINUED | OUTPATIENT
Start: 2017-12-28 | End: 2017-12-30 | Stop reason: HOSPADM

## 2017-12-28 RX ORDER — POTASSIUM CHLORIDE 7.45 MG/ML
10 INJECTION INTRAVENOUS
Status: DISCONTINUED | OUTPATIENT
Start: 2017-12-28 | End: 2017-12-30 | Stop reason: HOSPADM

## 2017-12-28 RX ORDER — POTASSIUM CHLORIDE 1.5 G/1.58G
20-40 POWDER, FOR SOLUTION ORAL
Status: DISCONTINUED | OUTPATIENT
Start: 2017-12-28 | End: 2017-12-30 | Stop reason: HOSPADM

## 2017-12-28 RX ORDER — POTASSIUM CL/LIDO/0.9 % NACL 10MEQ/0.1L
10 INTRAVENOUS SOLUTION, PIGGYBACK (ML) INTRAVENOUS
Status: DISCONTINUED | OUTPATIENT
Start: 2017-12-28 | End: 2017-12-30 | Stop reason: HOSPADM

## 2017-12-28 RX ADMIN — Medication 10 MEQ: at 12:47

## 2017-12-28 RX ADMIN — Medication 0.5 MG: at 05:08

## 2017-12-28 RX ADMIN — Medication 0.5 MG: at 00:47

## 2017-12-28 RX ADMIN — SODIUM CHLORIDE: 9 INJECTION, SOLUTION INTRAVENOUS at 20:45

## 2017-12-28 RX ADMIN — Medication 10 MEQ: at 08:51

## 2017-12-28 RX ADMIN — SODIUM CHLORIDE: 9 INJECTION, SOLUTION INTRAVENOUS at 16:34

## 2017-12-28 RX ADMIN — Medication 0.5 MG: at 14:12

## 2017-12-28 RX ADMIN — SODIUM CHLORIDE: 9 INJECTION, SOLUTION INTRAVENOUS at 03:17

## 2017-12-28 RX ADMIN — Medication 10 MEQ: at 11:32

## 2017-12-28 RX ADMIN — SODIUM CHLORIDE: 9 INJECTION, SOLUTION INTRAVENOUS at 08:58

## 2017-12-28 RX ADMIN — Medication 0.5 MG: at 20:41

## 2017-12-28 RX ADMIN — Medication 0.5 MG: at 09:31

## 2017-12-28 RX ADMIN — Medication 0.5 MG: at 16:32

## 2017-12-28 RX ADMIN — Medication 10 MEQ: at 10:17

## 2017-12-28 RX ADMIN — ACETAMINOPHEN 650 MG: 325 TABLET, FILM COATED ORAL at 10:15

## 2017-12-28 NOTE — PLAN OF CARE
Problem: Patient Care Overview  Goal: Plan of Care/Patient Progress Review  Outcome: No Change  Afebrile. VSS. CT scan completed this afternoon. Hosp aware of results. Patient advanced to clear liquids, tolerated apple juice and sherbet. Will be NPO at midnight. Voiding well, urine lighter in color. IV fluids remain at 200/hr. Dilaudid given X1 for mid-epigastric pain. Will continue to monitor and provide for needs.

## 2017-12-28 NOTE — PLAN OF CARE
Problem: Patient Care Overview  Goal: Plan of Care/Patient Progress Review  Has been NPO. IV infusing at 200 cc's/hr. Urine is now clear yellow.Points to mid epigastric area as distended. Having mid epigastric pain and mid back pain rated 6-5. After Dilaudid pain decreased to 2 or fell asleep. No nausea. Plan for AM labs.

## 2017-12-28 NOTE — PROGRESS NOTES
GASTROENTEROLOGY PROGRESS NOTE     SUBJECTIVE:  Pain had been improving overnight but now worse this am. Some nausea but no vomiting. +Flatus, no BM.      OBJECTIVE:    BP (!) 140/96  Pulse 55  Temp 98.6  F (37  C) (Oral)  Resp 18  SpO2 100%  Temp (24hrs), Av.3  F (36.8  C), Min:97.9  F (36.6  C), Max:98.6  F (37  C)    No data found.      Intake/Output Summary (Last 24 hours) at 17 0948  Last data filed at 17 0500   Gross per 24 hour   Intake          4336.66 ml   Output             2325 ml   Net          2011.66 ml        PHYSICAL EXAM  Gen: alert, oriented, NAD  CV: RRR  Lungs: clear  Abd: soft, minimal distension, moderately tender epigastrium and RUQ, mild tenderness LUQ     Additional Comments:  ROS, FH, SH: See initial GI consult for details.     I have reviewed the patient's new clinical lab results:     Recent Labs   Lab Test  17   0553  17   0643   WBC  3.2*  3.7*   HGB  11.2*  11.7   MCV  94  95   PLT  213  205     Recent Labs   Lab Test  17   0553  17   0643   POTASSIUM  3.1*  3.8   CHLORIDE  106  107   CO2  24  26   BUN  2*  5*   ANIONGAP  9  6     Recent Labs   Lab Test  17   0553  17   1514  17   0643   17   1015   ALBUMIN  2.9*   --   3.0*   --    --    BILITOTAL  1.0   --   0.6   --    --    ALT  56*   --   65*   --    --    AST  94*   --   124*   --    --    PROTEIN   --    --    --    --   30*   LIPASE  1256*  2051*  2102*   < >   --     < > = values in this interval not displayed.     Imagin/27 CT A/P with IV contrast:  IMPRESSION:  1. Mild inflammatory stranding adjacent to the pancreatic head and  along the right paracolic gutter. Though nonspecific, this could  represent acute pancreatitis.  2. Mild to moderate nonspecific peritoneal fluid within the pelvis.  3. Severe hepatic fatty infiltration--possible etiologies include  consumption of alcohol or excessive carbohydrate intake, especially  sugar/fructose.  Metabolic  syndrome commonly occurs in combination  with nonalcoholic fatty liver disease. Although often reversible,  nonalcoholic fatty liver disease can progress to steatohepatitis and  Cirrhosis.    12/27 RUQ US:  IMPRESSION:    1. Hepatomegaly and diffuse fatty infiltration of the liver with  coarsened hepatic echotexture.  2. Pancreas partially obscured by gas.  3. No specific etiology of abdominal pain is seen.    Assessment:  22 year old female without any significant past medical history presenting with epigastric/right upper quadrant pain with associated nausea, vomiting. Workup consistent with acute pancreatitis with initial lipase 2100 and noted and LFTs. CT 12/27 showed mild inflammatory changes around the pancreatic head consistent with pancreatitis, mild peritoneal fluid, and severe fatty liver. US without gall stones or bile duct dilation. Also showing fatty liver/hepatomegaly with coarsened liver echotexture. Hepatitis B/C serology negative. BMI 21. TG normal. No inciting meds. Denies alcohol use.     1. Idiopathic pancreatitis. No clear inciting cause for pancreatitis. Autoimmune pancreatitis possible vs microlithiasis/biliary sludge given some elevation in LFTs (AST>>ALT). CT shows mild changes of pancreatitis around the pancreatic head. No signs of pancreatic necrosis or pseudocyst. No significant alcohol use. No gall stones or bile duct dilation on US. No inciting meds. Lipase improving 2102-->1256. Total bilirubin normal. ALT 65-->56, -->94, alk phos normal. Creatinine normal. Received additional fluid boluses yesterday and pain had been improving, but now worse this am. No fevers. WBC slightly low without antibiotics.  --IgG4 subclasses ordered to eval for possible autoimmune pancreatitis.  --Patient fairly tender on exam, recommend continuing NPO today.  --Continue IV fluids/pain management.  --Encouraged ambulation.  --Will need outpatient EUS in 6-8 weeks to further assess the pancreas once  this episode has resolved.     2. Non alcoholic fatty liver disease. Patient with normal BMI, 21. No history of DM or hyperlipidemia. TG as above is normal. No noted biochemical changes consistent with chronic liver disease.   --D/w with patient that fatty liver can progress to PARADA with eventual cirrhosis. Typical recommendation is weight loss although patient's BMI is in normal range.   --Recommend outpatient follow up in our hepatology clinic.     Nohemy Vincent PA-C  [facility]

## 2017-12-28 NOTE — PLAN OF CARE
Problem: Patient Care Overview  Goal: Individualization & Mutuality  Outcome: No Change  Asking for pain meds every 3 hours.  Rt. Side of abdomin very tender  to touch.  She was able to get up and shower.

## 2017-12-28 NOTE — PROGRESS NOTES
[facility]  Hospitalist Progress Note  Name: Veronica Cody    MRN: [id number]  YOB: 1995    Age: 22 year old  Date of admission: 12/26/2017  Primary care provider: No Ref-Primary, Physician      Reason for Stay (Diagnosis): acute pancreatitis         Assessment and Plan:      Summary of Stay:  Ms. Cody is a 22-year-old female who presents to the hospital with nausea, vomiting, abdominal pain for the past 3 days. She was seen at Urgent Care and found to have an amylase and mildly elevated liver function tests. On arrival here to the ER, further workup included a lipase noted to be elevated at 2600 and an abdominal ultrasound was performed showing mild hepatomegaly and fatty infiltration of the liver. No ulcer, no gallstones were found. She was admitted with a diagnosis of pancreatitis.      1. Acute pancreatitis:  Etiology unclear.  No significant alcohol use.  No gallstones on imaging studies.   Triglycerides are normal.  She is on no oral medications, so doubt medication-induced pancreatitis.  CT scan of the abdomen and pelvis demonstrated no obvious anatomic cause for her pancreatitis.  --Will continue IV fluid resuscitation. We will also continue pain control.  Recheck lipase tomorrow.  -- GI input greatly appreciated.  Labs ordered to assess for possible autoimmune pancreatitis as no clear other etiologies.  Continue n.p.o. for now       2.  Nausea/vomiting secondary to above: Zofran as needed.     3.  Abnormal urinalysis: Urine culture showed only urogenital michelle. Patient denies any urinary symptoms. No antibiotics at this moment unless patient develops symptoms.      DVT Prophylaxis: Low Risk/Ambulatory with no VTE prophylaxis indicated  Code Status: Full Code  Discharge Dispo: home when able and diet could be advanced.  Estimated Disch Date / # of Days until Disch: at least 2 more days        Interval History (Subjective):      abd still sore but slowly better.  +flatus         Physical Exam:      Vital signs:  Temp: 98.6  F (37  C) Temp src: Oral BP: (!) 140/96 Pulse: 55 Heart Rate: 61 Resp: 18 SpO2: 100 % O2 Device: None (Room air)        There is no height or weight on file to calculate BMI.      I/O last 3 completed shifts:  In: 4336.66 [P.O.:360; I.V.:3976.66]  Out: 2325 [Urine:2325]  There were no vitals filed for this visit.    Constitutional: Awake, alert, cooperative, no apparent distress   Respiratory: Nl work of breathing. Clear to auscultation bilaterally, no crackles or wheezing   Cardiovascular: Regular rate and rhythm, normal S1 and S2, and no murmur noted   Abdomen: Normal bowel sounds, soft, non-distended, notable epigastric tenderness    Skin: No rashes, no cyanosis, dry to touch   Neuro: CN 2-12 intact, no localizing weakness   Extremities: No edema, normal range of motion   HEENT Normocephalic, atraumatic, normal nasal turbinates; oropharynx clear   Neck Supple; nl inspection; trachea midline; no thryomegaly   Psychiatric: A+O x3. Normal affect          Medications:      All current medications were reviewed with changes reflected in problem list.         Data:      All new lab and imaging data was reviewed.   Labs:    Recent Labs  Lab 12/28/17  0553 12/27/17  0643   WBC 3.2* 3.7*   HGB 11.2* 11.7   HCT 34.9* 37.0   MCV 94 95    205       Recent Labs  Lab 12/28/17  0553 12/27/17  0643    139   POTASSIUM 3.1* 3.8   CHLORIDE 106 107   CO2 24 26   ANIONGAP 9 6   GLC 76 76   BUN 2* 5*   CR 0.45* 0.56   GFRESTIMATED >90 >90   GFRESTBLACK >90 >90   ALPHONSO 7.7* 8.3*   PROTTOTAL 6.1* 6.3*   ALBUMIN 2.9* 3.0*   BILITOTAL 1.0 0.6   ALKPHOS 90 106   AST 94* 124*   ALT 56* 65*       Recent Labs  Lab 12/28/17  0553 12/27/17  1514 12/27/17  0643   LIPASE 1256* 2051* 2102*      Imaging:   Recent Results (from the past 24 hour(s))   CT Abdomen w/o & w & Pelvis w Contrast    Narrative    CT ABDOMEN WITHOUT AND WITH CONTRAST AND PELVIS WITH CONTRAST  12/27/2017  5:36 PM     HISTORY: Acute pancreatitis.     CONTRAST DOSE:  62 mL Isovue-370    Radiation dose for this scan was reduced using automated exposure  control, adjustment of the mA and/or kV according to patient size, or  iterative reconstruction technique.    FINDINGS:  There is severe hepatic fatty infiltration. On unenhanced  images, no renal stones or pancreatic stones are demonstrated. There  is no hydronephrosis. Following contrast administration, there appears  to be mild inflammatory stranding adjacent to the pancreatic head  which could represent acute pancreatitis. No rim-enhancing fluid  collection is demonstrated. There is also stranding along the right  paracolic gutter. A normal appendix is noted. The kidneys, adrenal  glands, spleen, and gallbladder appear within normal limits. There is  a small to moderate amount of peritoneal fluid within the pelvis.  Pelvic contents are otherwise unremarkable.      Impression    IMPRESSION:  1. Mild inflammatory stranding adjacent to the pancreatic head and  along the right paracolic gutter. Though nonspecific, this could  represent acute pancreatitis.  2. Mild to moderate nonspecific peritoneal fluid within the pelvis.  3. Severe hepatic fatty infiltration--possible etiologies include  consumption of alcohol or excessive carbohydrate intake, especially  sugar/fructose.  Metabolic syndrome commonly occurs in combination  with nonalcoholic fatty liver disease. Although often reversible,  nonalcoholic fatty liver disease can progress to steatohepatitis and  cirrhosis.    MD Joel SEGUNDO -[phone]

## 2017-12-29 LAB
ALBUMIN SERPL-MCNC: 3.5 G/DL (ref 3.4–5)
ALP SERPL-CCNC: 109 U/L (ref 40–150)
ALT SERPL W P-5'-P-CCNC: 56 U/L (ref 0–50)
AST SERPL W P-5'-P-CCNC: 95 U/L (ref 0–45)
BILIRUB DIRECT SERPL-MCNC: 0.5 MG/DL (ref 0–0.2)
BILIRUB SERPL-MCNC: 0.9 MG/DL (ref 0.2–1.3)
IGG SERPL-MCNC: 838 MG/DL (ref 695–1620)
IGG1 SER-MCNC: 563 MG/DL (ref 300–856)
IGG2 SER-MCNC: 112 MG/DL (ref 158–761)
IGG3 SER-MCNC: 25 MG/DL (ref 24–192)
IGG4 SER-MCNC: 29 MG/DL (ref 11–86)
LIPASE SERPL-CCNC: 771 U/L (ref 73–393)
POTASSIUM SERPL-SCNC: 3.1 MMOL/L (ref 3.4–5.3)
POTASSIUM SERPL-SCNC: 4.4 MMOL/L (ref 3.4–5.3)
PROT SERPL-MCNC: 7 G/DL (ref 6.8–8.8)
WBC # BLD AUTO: 4 10E9/L (ref 4–11)

## 2017-12-29 PROCEDURE — 25000128 H RX IP 250 OP 636: Performed by: INTERNAL MEDICINE

## 2017-12-29 PROCEDURE — 85048 AUTOMATED LEUKOCYTE COUNT: CPT | Performed by: INTERNAL MEDICINE

## 2017-12-29 PROCEDURE — 12000011 ZZH R&B MS OVERFLOW

## 2017-12-29 PROCEDURE — 83690 ASSAY OF LIPASE: CPT | Performed by: INTERNAL MEDICINE

## 2017-12-29 PROCEDURE — 84132 ASSAY OF SERUM POTASSIUM: CPT | Performed by: INTERNAL MEDICINE

## 2017-12-29 PROCEDURE — 99232 SBSQ HOSP IP/OBS MODERATE 35: CPT | Performed by: INTERNAL MEDICINE

## 2017-12-29 PROCEDURE — 80076 HEPATIC FUNCTION PANEL: CPT | Performed by: INTERNAL MEDICINE

## 2017-12-29 PROCEDURE — 36415 COLL VENOUS BLD VENIPUNCTURE: CPT | Performed by: INTERNAL MEDICINE

## 2017-12-29 PROCEDURE — 25000132 ZZH RX MED GY IP 250 OP 250 PS 637: Performed by: INTERNAL MEDICINE

## 2017-12-29 RX ORDER — BENZOCAINE/MENTHOL 6 MG-10 MG
LOZENGE MUCOUS MEMBRANE 2 TIMES DAILY PRN
Status: DISCONTINUED | OUTPATIENT
Start: 2017-12-29 | End: 2017-12-30 | Stop reason: HOSPADM

## 2017-12-29 RX ADMIN — ACETAMINOPHEN 650 MG: 325 TABLET, FILM COATED ORAL at 08:02

## 2017-12-29 RX ADMIN — SODIUM CHLORIDE: 9 INJECTION, SOLUTION INTRAVENOUS at 17:27

## 2017-12-29 RX ADMIN — SODIUM CHLORIDE: 9 INJECTION, SOLUTION INTRAVENOUS at 05:15

## 2017-12-29 RX ADMIN — Medication 10 MEQ: at 11:26

## 2017-12-29 RX ADMIN — Medication 0.5 MG: at 01:02

## 2017-12-29 RX ADMIN — Medication 10 MEQ: at 10:24

## 2017-12-29 RX ADMIN — Medication 0.5 MG: at 06:05

## 2017-12-29 RX ADMIN — Medication 10 MEQ: at 08:10

## 2017-12-29 RX ADMIN — Medication 10 MEQ: at 09:16

## 2017-12-29 RX ADMIN — HYDROCORTISONE: 1 CREAM TOPICAL at 09:44

## 2017-12-29 NOTE — PROGRESS NOTES
"SPIRITUAL HEALTH SERVICES  SPIRITUAL ASSESSMENT Progress Note  [facility] 242    PRIMARY FOCUS:     Goals of care    Emotional/spiritual/Taoist distress    Support for coping    ILLNESS CIRCUMSTANCES:   Reviewed documentation. Reflective conversation shared with pt Veronica which integrated elements of illness and family narratives.     Context of Serious Illness/Symptom(s) - Veronica is being treated for pancreatitis of unclear etiology.    Persons/Resources Involved - Veronica lives with her sister and sister's family in [place].  She mentioned two other sisters: one in [place] and the other in [place].  Her mother lives in [facility] [place], where Veronica is from.    DISTRESS:     Emotional/Existential/Relational Distress - Veronica became tearful when she talked about the following stressors:  o She enjoyed going to school at [employer] and would like to be either an environmental or .  However, she has run out of funding for school.  Veronica expressed a sense of failure.  o Veronica described her relationship with her mother as \"cut off;\" when they talk her mother is critical.    Spiritual/Congregational Distress - Veronica believes that she does not deserve a chance to finish her education because \"someone else would have succeeded.\"  Offered another image of God, who wants her to be happy and fulfilled.    Social/Cultural/Economic Distress - Veronica is no longer able to finance her education and might have to return to [place].    SPIRIT (Coping):     Evangelical/Madhuri - Mandaen; she is not affiliated with a Catholic at this time.  She attended a service at [facility].  Oriented her to the pastoral  at [facility].    Spiritual Practice(s) - Veronica welcomed prayer.    Emotional/Existential/Relational Connections - She mentioned journaling and exercising to help cope with stress. Oriented her to the [employer] chance and offered a brief guided imagery of breathing in God's healing love and breathing out stress before we " prayed.  Veronica expressed interest in [employer] Timer chance.  She reported that it was helpful to talk to someone about her stressors.    SENSE-MAKING:    Goals of Care - Veronica mentioned that she might need to return for an endoscopy in eight week.  She requested my contact information, and I gave her my business card.    Meaning/Sense-Making - Veronica processed her thoughts and feelings about her stressors, identified resources for coping, and reflected on her hopes and fears.    PLAN: No further plans; I and other chaplains remain available per pt/family request.    Nikko Johnson M.Div., [facility]  Staff   Pager [phone]

## 2017-12-29 NOTE — PROGRESS NOTES
[facility]  Hospitalist Progress Note  Name: Veronica Cody    MRN: [id number]  YOB: 1995    Age: 22 year old  Date of admission: 12/26/2017  Primary care provider: No Ref-Primary, Physician      Reason for Stay (Diagnosis): acute pancreatitis         Assessment and Plan:      Summary of Stay:  Ms. Cody is a 22-year-old female who presents to the hospital with nausea, vomiting, abdominal pain for the past 3 days. She was seen at Urgent Care and found to have an amylase and mildly elevated liver function tests. On arrival here to the ER, further workup included a lipase noted to be elevated at 2600 and an abdominal ultrasound was performed showing mild hepatomegaly and fatty infiltration of the liver. No ulcer, no gallstones were found. She was admitted with a diagnosis of pancreatitis.      1. Acute pancreatitis:  Etiology unclear.  No significant alcohol use.  No gallstones on imaging studies.   Triglycerides are normal.  She is on no oral medications, so doubt medication-induced pancreatitis.  CT scan of the abdomen and pelvis demonstrated no obvious anatomic cause for her pancreatitis.  Questionable component of autoimmune pancreatitis.  --Will continue IV fluid resuscitation. We will also continue pain control.  Recheck lipase tomorrow.   -- GI input greatly appreciated.  Labs ordered to assess for possible autoimmune pancreatitis as no clear other etiologies.  Start clear liquids if patient does not require any IV pain medications throughout the morning     2.  Nausea/vomiting secondary to above: Zofran as needed.     3.  Abnormal urinalysis: Urine culture showed only urogenital michelle. Patient denies any urinary symptoms. No antibiotics at this moment unless patient develops symptoms.      DVT Prophylaxis: Low Risk/Ambulatory with no VTE prophylaxis indicated  Code Status: Full Code  Discharge Dispo: home when able and diet could be advanced.  Estimated Disch Date /  "# of Days until Disch: at least 2 more days until pain is better controlled and can tolerate warfarin diet        Interval History (Subjective):      abd still constant but tolerable.         Physical Exam:      Vital signs:  Temp: 98.3  F (36.8  C) Temp src: Oral BP: 138/82 Pulse: 79 Heart Rate: 57 Resp: 16 SpO2: 100 % O2 Device: None (Room air)   Height: 163 cm (5' 4.17\") Weight: 56.4 kg (124 lb 5.4 oz)  Estimated body mass index is 21.23 kg/(m^2) as calculated from the following:    Height as of this encounter: 1.63 m (5' 4.17\").    Weight as of this encounter: 56.4 kg (124 lb 5.4 oz).      I/O last 3 completed shifts:  In: 3735 [P.O.:120; I.V.:3615]  Out: 1000 [Urine:1000]  Vitals:    12/29/17 0800   Weight: 56.4 kg (124 lb 5.4 oz)       Constitutional: Awake, alert, cooperative, no apparent distress   Respiratory: Nl work of breathing. Clear to auscultation bilaterally, no crackles or wheezing   Cardiovascular: Regular rate and rhythm, normal S1 and S2, and no murmur noted   Abdomen: Normal bowel sounds, soft, non-distended, notable epigastric tenderness    Skin: No rashes, no cyanosis, dry to touch   Neuro: CN 2-12 intact, no localizing weakness   Extremities: No edema, normal range of motion   HEENT Normocephalic, atraumatic, normal nasal turbinates; oropharynx clear   Neck Supple; nl inspection; trachea midline; no thryomegaly   Psychiatric: A+O x3. Normal affect          Medications:      All current medications were reviewed with changes reflected in problem list.         Data:      All new lab and imaging data was reviewed.   Labs:    Recent Labs  Lab 12/29/17  0652 12/28/17  0553 12/27/17  0643   WBC 4.0 3.2* 3.7*   HGB  --  11.2* 11.7   HCT  --  34.9* 37.0   MCV  --  94 95   PLT  --  213 205       Recent Labs  Lab 12/29/17  0652 12/28/17  1620 12/28/17  0553 12/27/17  0643   NA  --   --  139 139   POTASSIUM 3.1* 3.4 3.1* 3.8   CHLORIDE  --   --  106 107   CO2  --   --  24 26   ANIONGAP  --   --  9 6 "   GLC  --   --  76 76   BUN  --   --  2* 5*   CR  --   --  0.45* 0.56   GFRESTIMATED  --   --  >90 >90   GFRESTBLACK  --   --  >90 >90   ALPHONSO  --   --  7.7* 8.3*   PROTTOTAL 7.0  --  6.1* 6.3*   ALBUMIN 3.5  --  2.9* 3.0*   BILITOTAL 0.9  --  1.0 0.6   ALKPHOS 109  --  90 106   AST 95*  --  94* 124*   ALT 56*  --  56* 65*       Recent Labs  Lab 12/29/17  0652 12/28/17  0553 12/27/17  1514   LIPASE 771* 1256* 2051*      Imaging:   Recent Results (from the past 24 hour(s))   CT Abdomen w/o & w & Pelvis w Contrast    Narrative    CT ABDOMEN WITHOUT AND WITH CONTRAST AND PELVIS WITH CONTRAST  12/27/2017 5:36 PM     HISTORY: Acute pancreatitis.     CONTRAST DOSE:  62 mL Isovue-370    Radiation dose for this scan was reduced using automated exposure  control, adjustment of the mA and/or kV according to patient size, or  iterative reconstruction technique.    FINDINGS:  There is severe hepatic fatty infiltration. On unenhanced  images, no renal stones or pancreatic stones are demonstrated. There  is no hydronephrosis. Following contrast administration, there appears  to be mild inflammatory stranding adjacent to the pancreatic head  which could represent acute pancreatitis. No rim-enhancing fluid  collection is demonstrated. There is also stranding along the right  paracolic gutter. A normal appendix is noted. The kidneys, adrenal  glands, spleen, and gallbladder appear within normal limits. There is  a small to moderate amount of peritoneal fluid within the pelvis.  Pelvic contents are otherwise unremarkable.      Impression    IMPRESSION:  1. Mild inflammatory stranding adjacent to the pancreatic head and  along the right paracolic gutter. Though nonspecific, this could  represent acute pancreatitis.  2. Mild to moderate nonspecific peritoneal fluid within the pelvis.  3. Severe hepatic fatty infiltration--possible etiologies include  consumption of alcohol or excessive carbohydrate intake, especially  sugar/fructose.   Metabolic syndrome commonly occurs in combination  with nonalcoholic fatty liver disease. Although often reversible,  nonalcoholic fatty liver disease can progress to steatohepatitis and  cirrhosis.    MD Joel SEGUNDO -[phone]

## 2017-12-29 NOTE — PROGRESS NOTES
GASTROENTEROLOGY PROGRESS NOTE       SUBJECTIVE:  Main complaint is a headache and intermittent back spasms. Still having epigastric pain but improving, currently rated 4/10 in severity. Hungry.     OBJECTIVE:    /82  Pulse 79  Temp 98.3  F (36.8  C) (Oral)  Resp 16  SpO2 100%  Temp (24hrs), Av.1  F (36.7  C), Min:98  F (36.7  C), Max:98.3  F (36.8  C)    No data found.      Intake/Output Summary (Last 24 hours) at 17 0804  Last data filed at 17 0515   Gross per 24 hour   Intake             3735 ml   Output             1000 ml   Net             2735 ml        PHYSICAL EXAM    Constitutional: no acute distress  Cardiovascular: RRR, normal S1, S2, no murmur appreciated   Respiratory: good transmission, CTAB  Abdomen: soft, mild tenderness in epigastric and right abdomen, +bowel sounds  NEURO: CN 2-12 grossly intact, no focal deficits  SKIN: No jaundice         Additional Comments:  ROS, FH, SH: See initial GI consult for details.     I have reviewed the patient's new clinical lab results:     Recent Labs   Lab Test  17   0652  17   0553  17   0643   WBC  4.0  3.2*  3.7*   HGB   --   11.2*  11.7   MCV   --   94  95   PLT   --   213  205     Recent Labs   Lab Test  17   0652  17   1620  17   0553  17   0643   POTASSIUM  3.1*  3.4  3.1*  3.8   CHLORIDE   --    --   106  107   CO2   --    --   24  26   BUN   --    --   2*  5*   ANIONGAP   --    --   9  6     Recent Labs   Lab Test  17   0652  17   0553  17   1514  17   0643   17   1015   ALBUMIN   --   2.9*   --   3.0*   --    --    BILITOTAL   --   1.0   --   0.6   --    --    ALT   --   56*   --   65*   --    --    AST   --   94*   --   124*   --    --    PROTEIN   --    --    --    --    --   30*   LIPASE  771*  1256*  2051*  2102*   < >   --     < > = values in this interval not displayed.        Imagin/27 CT A/P with IV contrast:  IMPRESSION:  1. Mild inflammatory  stranding adjacent to the pancreatic head and  along the right paracolic gutter. Though nonspecific, this could  represent acute pancreatitis.  2. Mild to moderate nonspecific peritoneal fluid within the pelvis.  3. Severe hepatic fatty infiltration--possible etiologies include  consumption of alcohol or excessive carbohydrate intake, especially  sugar/fructose.  Metabolic syndrome commonly occurs in combination  with nonalcoholic fatty liver disease. Although often reversible,  nonalcoholic fatty liver disease can progress to steatohepatitis and  Cirrhosis.     12/27 RUQ US:  IMPRESSION:    1. Hepatomegaly and diffuse fatty infiltration of the liver with  coarsened hepatic echotexture.  2. Pancreas partially obscured by gas.  3. No specific etiology of abdominal pain is seen.     Assessment:  22 year old female without any significant past medical history presenting with epigastric/right upper quadrant pain with associated nausea, vomiting. Workup consistent with acute pancreatitis with initial lipase 2100 and noted and LFTs. CT 12/27 showed mild inflammatory changes around the pancreatic head consistent with pancreatitis, mild peritoneal fluid, and severe fatty liver. US without gall stones or bile duct dilation. Also showing fatty liver/hepatomegaly with coarsened liver echotexture. Hepatitis B/C serology negative. BMI 21. TG normal. No inciting meds. Denies alcohol use.      1. Idiopathic pancreatitis. No clear inciting cause for pancreatitis. Autoimmune pancreatitis possible vs microlithiasis/biliary sludge given some elevation in LFTs (AST>>ALT). CT shows mild changes of pancreatitis around the pancreatic head. No signs of pancreatic necrosis or pseudocyst. No significant alcohol use. No gall stones or bile duct dilation on US. No inciting meds. Lipase and LFT's improving.  --IgG4 subclasses ordered to eval for possible autoimmune pancreatitis.  --Patient fairly tender on exam, recommend continuing NPO  today.  --Continue IV fluids/pain management. If pain continues to improve and she does not require IV pain meds this AM, then can try clear liquid diet at lunch.  --Encouraged ambulation.  --Will need outpatient EUS in 6-8 weeks to further assess the pancreas once this episode has resolved.      2. Non alcoholic fatty liver disease. Patient with normal BMI, 21. No history of DM or hyperlipidemia. TG as above is normal. No noted biochemical changes consistent with chronic liver disease. Patient received education about fatty liver.  --Recommend outpatient follow up in our hepatology clinic.       Sejal Leiva PA-C  [facility]

## 2017-12-29 NOTE — PLAN OF CARE
Problem: Patient Care Overview  Goal: Plan of Care/Patient Progress Review  Outcome: No Change  Pt having headache today and tylenol given without relief. Resting off and on. This afternoon may start clears and apple juice given. K replaced and awaiting recheck at 1400. Pleasant though concerned about how to pay for hospitalization and spoke with both  and SW. Will monitor.

## 2017-12-29 NOTE — PLAN OF CARE
"Problem: Patient Care Overview  Goal: Plan of Care/Patient Progress Review  Outcome: Improving  Rates abd pain 4/10; dilaudid x 2 providing relief.  Appetite returning; states hungry.  Cont to tolerates ice chips.  K+ at 1600 3.4; recheck in am.  Teary at times worried about \"having to live with this pain from now on\".  Agreed to speak with  tomorrow.  Would benefit with social work consult.  Cont with plan of care.        "

## 2017-12-29 NOTE — CONSULTS
D:  SWS responding to consult.  I: Met with Veronica Cody who is requesting information as she does not currently have health care insurance.  Per financial counseling, she could quality for Emergency MA.  With her permission, a referral to financial counseling was made. She states her sister lives in the [facility] area and is support for her.  A: Veronica receptive to support and assistance from SW, very alert and aware, wanting to make the best decision for herself.    P:  SWS will continue to be available.

## 2017-12-29 NOTE — PLAN OF CARE
Problem: Patient Care Overview  Goal: Plan of Care/Patient Progress Review  Outcome: No Change  Afebrile. VSS. Voiding well, urine light/pale yellow. IV infusing at 125/hr. Dilaudid given X1 for mid-epigastric pain. Remains NPO. Taking a couple ice chips. K+ recheck this morning. Will continue to monitor and provide for needs.

## 2017-12-30 VITALS
HEART RATE: 66 BPM | OXYGEN SATURATION: 100 % | TEMPERATURE: 98.5 F | BODY MASS INDEX: 21.23 KG/M2 | SYSTOLIC BLOOD PRESSURE: 122 MMHG | DIASTOLIC BLOOD PRESSURE: 78 MMHG | HEIGHT: 64 IN | WEIGHT: 124.34 LBS | RESPIRATION RATE: 16 BRPM

## 2017-12-30 LAB
ANION GAP SERPL CALCULATED.3IONS-SCNC: 16 MMOL/L (ref 3–14)
BUN SERPL-MCNC: 2 MG/DL (ref 7–30)
CALCIUM SERPL-MCNC: 8.4 MG/DL (ref 8.5–10.1)
CHLORIDE SERPL-SCNC: 105 MMOL/L (ref 94–109)
CO2 SERPL-SCNC: 12 MMOL/L (ref 20–32)
CREAT SERPL-MCNC: 0.53 MG/DL (ref 0.52–1.04)
ERYTHROCYTE [DISTWIDTH] IN BLOOD BY AUTOMATED COUNT: 15.9 % (ref 10–15)
GFR SERPL CREATININE-BSD FRML MDRD: >90 ML/MIN/1.7M2
GLUCOSE SERPL-MCNC: 66 MG/DL (ref 70–99)
HCT VFR BLD AUTO: 37.5 % (ref 35–47)
HGB BLD-MCNC: 12.1 G/DL (ref 11.7–15.7)
LIPASE SERPL-CCNC: 978 U/L (ref 73–393)
MCH RBC QN AUTO: 30.6 PG (ref 26.5–33)
MCHC RBC AUTO-ENTMCNC: 32.3 G/DL (ref 31.5–36.5)
MCV RBC AUTO: 95 FL (ref 78–100)
PLATELET # BLD AUTO: 222 10E9/L (ref 150–450)
POTASSIUM SERPL-SCNC: 3.7 MMOL/L (ref 3.4–5.3)
RBC # BLD AUTO: 3.96 10E12/L (ref 3.8–5.2)
SODIUM SERPL-SCNC: 133 MMOL/L (ref 133–144)
WBC # BLD AUTO: 4.1 10E9/L (ref 4–11)

## 2017-12-30 PROCEDURE — 83690 ASSAY OF LIPASE: CPT | Performed by: INTERNAL MEDICINE

## 2017-12-30 PROCEDURE — 25000128 H RX IP 250 OP 636: Performed by: INTERNAL MEDICINE

## 2017-12-30 PROCEDURE — 25000132 ZZH RX MED GY IP 250 OP 250 PS 637: Performed by: INTERNAL MEDICINE

## 2017-12-30 PROCEDURE — 85027 COMPLETE CBC AUTOMATED: CPT | Performed by: INTERNAL MEDICINE

## 2017-12-30 PROCEDURE — 80048 BASIC METABOLIC PNL TOTAL CA: CPT | Performed by: INTERNAL MEDICINE

## 2017-12-30 PROCEDURE — 99238 HOSP IP/OBS DSCHRG MGMT 30/<: CPT | Performed by: INTERNAL MEDICINE

## 2017-12-30 PROCEDURE — 36415 COLL VENOUS BLD VENIPUNCTURE: CPT | Performed by: INTERNAL MEDICINE

## 2017-12-30 RX ORDER — OXYCODONE HYDROCHLORIDE 5 MG/1
5-10 TABLET ORAL
Qty: 40 TABLET | Refills: 0 | Status: SHIPPED | OUTPATIENT
Start: 2017-12-30

## 2017-12-30 RX ORDER — BENZOCAINE/MENTHOL 6 MG-10 MG
LOZENGE MUCOUS MEMBRANE 2 TIMES DAILY PRN
Qty: 30 G | Refills: 1 | Status: SHIPPED | OUTPATIENT
Start: 2017-12-30

## 2017-12-30 RX ORDER — ONDANSETRON 4 MG/1
4 TABLET, ORALLY DISINTEGRATING ORAL EVERY 6 HOURS PRN
Qty: 10 TABLET | Refills: 0 | Status: SHIPPED | OUTPATIENT
Start: 2017-12-30

## 2017-12-30 RX ADMIN — Medication 0.5 MG: at 02:05

## 2017-12-30 RX ADMIN — HYDROCORTISONE: 1 CREAM TOPICAL at 09:11

## 2017-12-30 RX ADMIN — DEXTROSE AND SODIUM CHLORIDE: 5; 900 INJECTION, SOLUTION INTRAVENOUS at 09:04

## 2017-12-30 RX ADMIN — SODIUM CHLORIDE: 9 INJECTION, SOLUTION INTRAVENOUS at 01:34

## 2017-12-30 RX ADMIN — ACETAMINOPHEN 650 MG: 325 TABLET, FILM COATED ORAL at 01:33

## 2017-12-30 NOTE — DISCHARGE INSTRUCTIONS
[facility] [phone]  Endoscopic ultrasound 6-8 weeks/hepatology clinic

## 2017-12-30 NOTE — PLAN OF CARE
Problem: Patient Care Overview  Goal: Plan of Care/Patient Progress Review  Outcome: No Change  Pt up in room and jorgito well. Tolerating clears and wants to dc to home. Eczema to arms and chest and using hydrocortisone with relief. Awaiting meds from pharmacy and will dc to home. She has number to contact counselor regarding insurance and payment. Will monitor.    Pt dc info gone over with her and meds filled here and sent with her. Ready for dc and awaiting ride.

## 2017-12-30 NOTE — PLAN OF CARE
Problem: Patient Care Overview  Goal: Plan of Care/Patient Progress Review  Outcome: Improving  VSS. Afebrile. Tolerating minimal clear liquids including tea and ice chips. Denies need for pain medications. Up to shower and walking hallways. Active bowel sounds. Tender left side of abdomen. Voiding. Passing flatus. Continuous IV fluids running. Will continue to monitor and provide for cares.

## 2017-12-30 NOTE — DISCHARGE SUMMARY
[facility]  Discharge Summary        Veronica Cody MRN# [id number]   YOB: 1995 Age: 22 year old     Date of Admission:  12/26/2017  Date of Discharge:  12/30/2017  Admitting Physician:  Main Brumfield MD  Discharge Physician: Joel Swain MD  Discharging Service: Hospitalist     Primary Provider: No Ref-Primary, Physician  Primary Care Physician Phone Number: None         Discharge Diagnoses/Problem Oriented Hospital Course (Providers):    Veronica Cody was admitted on 12/26/2017 by Main Brumfield MD and I would refer you to their history and physical.      Patient was seen and examined on the day of discharge    Discharge diagnoses:  1. Acute pancreatitis, idiopathic with some concerns for autoimmune pancreatitis    Hospital course:  Ms. Cody is a 22-year-old female who presents to the hospital with nausea, vomiting, abdominal pain for the past 3 days pta. She was seen at Urgent Care and found to have an amylase and mildly elevated liver function tests. On arrival here to the ER, further workup included a lipase noted to be elevated at 2600 and an abdominal ultrasound was performed only showed mild hepatomegaly and fatty infiltration of the liver. No ulcer, no gallstones were found. She was admitted with a diagnosis of pancreatitis.  She was made n.p.o. and GI was consulted.  She is not chronically on any oral medications that would induce pancreatitis.  CT of the abdomen and pelvis demonstrated no obvious anatomic cause for her pancreatitis.  GI was consulted and there was some question about a component of autoimmune pancreatitis. IgG4 subclasses ordered to eval for possible autoimmune pancreatitis.  Pain improved without IV narcotic use and patient was started on clear liquids.  She slowly improved and will be advanced to full liquids at discharge.  Ideally would one watch her another day but due to financial constraints and the fact that her pain was controlled and  tolerating good clears, she'll be discharged to home.  She understands that there are still labs to be followed up with and ideally should follow-up with [facility] but if she is unable to afford the consultation, could follow up with primary M.D. to review the lab results and further GI referral if needed.    Disposition: Discharged to home in stable condition.  Advised on taking her diet slowly and to return for severe pain.             Pending Results:        Unresulted Labs Ordered in the Past 30 Days of this Admission     No orders found from 10/27/2017 to 12/27/2017.            Discharge Instructions and Follow-Up:      Follow-up Appointments     Follow-up and recommended labs and tests        Follow up with primary care provider, Physician No Ref-Primary, within 7   days for hospital follow- up.  The following labs/tests are recommended:   lipase.                      Discharge Disposition:      Discharged to home         Discharge Medications:        Current Discharge Medication List      START taking these medications    Details   oxyCODONE IR (ROXICODONE) 5 MG tablet Take 1-2 tablets (5-10 mg) by mouth every 3 hours as needed for moderate to severe pain  Qty: 40 tablet, Refills: 0    Associated Diagnoses: Acute pancreatitis, unspecified complication status, unspecified pancreatitis type      ondansetron (ZOFRAN-ODT) 4 MG ODT tab Take 1 tablet (4 mg) by mouth every 6 hours as needed for nausea or vomiting  Qty: 10 tablet, Refills: 0    Associated Diagnoses: Acute pancreatitis, unspecified complication status, unspecified pancreatitis type      hydrocortisone (CORTAID) 1 % cream Apply topically 2 times daily as needed for rash (eczema)  Qty: 30 g, Refills: 1    Associated Diagnoses: Flexural eczema               Allergies:       No Known Allergies        Consultations This Hospital Stay:      Consultation during this admission received from gastroenterology           Image Results From This  Hospital Stay (For Non-EPIC Providers):        Results for orders placed or performed during the hospital encounter of 12/26/17   US Abdomen Limited    Narrative    RIGHT UPPER QUADRANT ULTRASOUND December 26, 2017 11:01 AM    HISTORY: Right upper quadrant and epigastric pain.    COMPARISON: None.    FINDINGS:    Gallbladder: Normal with no cholelithiasis, wall thickening or focal  tenderness.      Bile ducts: CHD is normal diameter. No intrahepatic biliary  dilatation.    Liver: Mild hepatomegaly with liver measuring 17 cm in the right  midclavicular line. No focal lesions. Diffuse fatty infiltration and  coarsened echotexture.    Pancreas: Head and tail obscured by gas. Neck and body appear normal.    Right kidney: Normal.       Impression    IMPRESSION:    1. Hepatomegaly and diffuse fatty infiltration of the liver with  coarsened hepatic echotexture.  2. Pancreas partially obscured by gas.  3. No specific etiology of abdominal pain is seen.    TIFF ARGUETA MD   CT Abdomen w/o & w & Pelvis w Contrast    Narrative    CT ABDOMEN WITHOUT AND WITH CONTRAST AND PELVIS WITH CONTRAST  12/27/2017 5:36 PM     HISTORY: Acute pancreatitis.     CONTRAST DOSE:  62 mL Isovue-[id number]    Radiation dose for this scan was reduced using automated exposure  control, adjustment of the mA and/or kV according to patient size, or  iterative reconstruction technique.    FINDINGS:  There is severe hepatic fatty infiltration. On unenhanced  images, no renal stones or pancreatic stones are demonstrated. There  is no hydronephrosis. Following contrast administration, there appears  to be mild inflammatory stranding adjacent to the pancreatic head  which could represent acute pancreatitis. No rim-enhancing fluid  collection is demonstrated. There is also stranding along the right  paracolic gutter. A normal appendix is noted. The kidneys, adrenal  glands, spleen, and gallbladder appear within normal limits. There is  a small to moderate amount  of peritoneal fluid within the pelvis.  Pelvic contents are otherwise unremarkable.      Impression    IMPRESSION:  1. Mild inflammatory stranding adjacent to the pancreatic head and  along the right paracolic gutter. Though nonspecific, this could  represent acute pancreatitis.  2. Mild to moderate nonspecific peritoneal fluid within the pelvis.  3. Severe hepatic fatty infiltration--possible etiologies include  consumption of alcohol or excessive carbohydrate intake, especially  sugar/fructose.  Metabolic syndrome commonly occurs in combination  with nonalcoholic fatty liver disease. Although often reversible,  nonalcoholic fatty liver disease can progress to steatohepatitis and  cirrhosis.    RUBIN BENJAMIN MD

## 2017-12-30 NOTE — PLAN OF CARE
Problem: Patient Care Overview  Goal: Plan of Care/Patient Progress Review  Drank some tea and water. Denies nausea. No mid epigastric pain of back pain. Urine is clear yellow. C/O a headache rated 4 Tylenol and Dilaudid given and was able to rest. IV infusing at 125 cc's/hr. Is on a clear liquid diet. Said she has no appetite. Plan for AM labs.

## 2018-01-02 ENCOUNTER — HOSPITAL ENCOUNTER (OUTPATIENT)
Facility: CLINIC | Age: 23
End: 2018-01-02
Attending: INTERNAL MEDICINE | Admitting: INTERNAL MEDICINE